# Patient Record
Sex: FEMALE | Race: WHITE | NOT HISPANIC OR LATINO | Employment: OTHER | URBAN - NONMETROPOLITAN AREA
[De-identification: names, ages, dates, MRNs, and addresses within clinical notes are randomized per-mention and may not be internally consistent; named-entity substitution may affect disease eponyms.]

---

## 2023-12-06 PROBLEM — M51.37 DEGENERATION OF LUMBAR OR LUMBOSACRAL INTERVERTEBRAL DISC: Status: ACTIVE | Noted: 2023-12-06

## 2023-12-06 PROBLEM — M51.369 DEGENERATION OF LUMBAR INTERVERTEBRAL DISC: Status: ACTIVE | Noted: 2023-12-06

## 2023-12-06 PROBLEM — M79.7 FIBROMYALGIA: Status: ACTIVE | Noted: 2023-12-06

## 2023-12-06 PROBLEM — N25.81 SECONDARY HYPERPARATHYROIDISM (MULTI): Status: ACTIVE | Noted: 2023-12-06

## 2023-12-06 PROBLEM — M79.10 MUSCLE PAIN: Status: ACTIVE | Noted: 2023-12-06

## 2023-12-06 PROBLEM — K21.9 ESOPHAGEAL REFLUX: Status: ACTIVE | Noted: 2023-12-06

## 2023-12-06 PROBLEM — M41.9 SCOLIOSIS OF THORACIC SPINE: Status: ACTIVE | Noted: 2023-12-06

## 2023-12-06 PROBLEM — G43.709 CHRONIC MIGRAINE WITHOUT AURA: Status: ACTIVE | Noted: 2023-12-06

## 2023-12-06 PROBLEM — S63.90XA HAND SPRAIN: Status: ACTIVE | Noted: 2023-12-06

## 2023-12-06 PROBLEM — M47.812 CERVICAL SPONDYLOSIS WITHOUT MYELOPATHY: Status: ACTIVE | Noted: 2023-12-06

## 2023-12-06 PROBLEM — S93.409A ANKLE SPRAIN: Status: ACTIVE | Noted: 2023-12-06

## 2023-12-06 PROBLEM — J45.909 ASTHMA (HHS-HCC): Status: ACTIVE | Noted: 2023-12-06

## 2023-12-06 PROBLEM — K21.9 GASTROESOPHAGEAL REFLUX DISEASE: Status: ACTIVE | Noted: 2023-12-06

## 2023-12-06 PROBLEM — B27.90 INFECTIOUS MONONUCLEOSIS: Status: ACTIVE | Noted: 2023-12-06

## 2023-12-06 PROBLEM — G43.109 MIGRAINE WITH AURA AND WITHOUT STATUS MIGRAINOSUS, NOT INTRACTABLE: Status: ACTIVE | Noted: 2023-12-06

## 2023-12-06 PROBLEM — M54.2 NECK PAIN ON LEFT SIDE: Status: ACTIVE | Noted: 2023-12-06

## 2023-12-06 PROBLEM — R91.8 LUNG NODULES: Status: ACTIVE | Noted: 2023-12-06

## 2023-12-06 PROBLEM — M51.36 DEGENERATION OF LUMBAR INTERVERTEBRAL DISC: Status: ACTIVE | Noted: 2023-12-06

## 2023-12-06 PROBLEM — E04.1 NONTOXIC UNINODULAR GOITER: Status: ACTIVE | Noted: 2023-12-06

## 2023-12-06 PROBLEM — M41.20 IDIOPATHIC SCOLIOSIS AND KYPHOSCOLIOSIS: Status: ACTIVE | Noted: 2023-12-06

## 2023-12-06 PROBLEM — M51.379 DEGENERATION OF LUMBAR OR LUMBOSACRAL INTERVERTEBRAL DISC: Status: ACTIVE | Noted: 2023-12-06

## 2024-05-09 ENCOUNTER — APPOINTMENT (OUTPATIENT)
Dept: PRIMARY CARE | Facility: CLINIC | Age: 49
End: 2024-05-09
Payer: COMMERCIAL

## 2024-05-22 ENCOUNTER — APPOINTMENT (OUTPATIENT)
Dept: RADIOLOGY | Facility: HOSPITAL | Age: 49
End: 2024-05-22
Payer: COMMERCIAL

## 2024-05-22 ENCOUNTER — HOSPITAL ENCOUNTER (EMERGENCY)
Facility: HOSPITAL | Age: 49
Discharge: HOME | End: 2024-05-22
Attending: STUDENT IN AN ORGANIZED HEALTH CARE EDUCATION/TRAINING PROGRAM
Payer: COMMERCIAL

## 2024-05-22 VITALS
HEIGHT: 64 IN | WEIGHT: 130 LBS | RESPIRATION RATE: 17 BRPM | OXYGEN SATURATION: 100 % | HEART RATE: 91 BPM | SYSTOLIC BLOOD PRESSURE: 131 MMHG | TEMPERATURE: 97.2 F | DIASTOLIC BLOOD PRESSURE: 69 MMHG | BODY MASS INDEX: 22.2 KG/M2

## 2024-05-22 DIAGNOSIS — N30.90 CYSTITIS: Primary | ICD-10-CM

## 2024-05-22 DIAGNOSIS — R10.9 ABDOMINAL PAIN, UNSPECIFIED ABDOMINAL LOCATION: ICD-10-CM

## 2024-05-22 DIAGNOSIS — B37.9 YEAST INFECTION: ICD-10-CM

## 2024-05-22 DIAGNOSIS — R11.2 NAUSEA AND VOMITING, UNSPECIFIED VOMITING TYPE: ICD-10-CM

## 2024-05-22 DIAGNOSIS — N20.0 KIDNEY STONES: ICD-10-CM

## 2024-05-22 LAB
ALBUMIN SERPL BCP-MCNC: 4.3 G/DL (ref 3.4–5)
ALP SERPL-CCNC: 72 U/L (ref 33–110)
ALT SERPL W P-5'-P-CCNC: 9 U/L (ref 7–45)
ANION GAP SERPL CALC-SCNC: 15 MMOL/L (ref 10–20)
APPEARANCE UR: ABNORMAL
AST SERPL W P-5'-P-CCNC: 21 U/L (ref 9–39)
BASOPHILS # BLD AUTO: 0.05 X10*3/UL (ref 0–0.1)
BASOPHILS NFR BLD AUTO: 0.7 %
BILIRUB SERPL-MCNC: 0.4 MG/DL (ref 0–1.2)
BILIRUB UR STRIP.AUTO-MCNC: NEGATIVE MG/DL
BUN SERPL-MCNC: 7 MG/DL (ref 6–23)
CALCIUM SERPL-MCNC: 9.9 MG/DL (ref 8.6–10.3)
CAOX CRY #/AREA UR COMP ASSIST: ABNORMAL /HPF
CHLORIDE SERPL-SCNC: 99 MMOL/L (ref 98–107)
CO2 SERPL-SCNC: 28 MMOL/L (ref 21–32)
COLOR UR: YELLOW
CREAT SERPL-MCNC: 1 MG/DL (ref 0.5–1.05)
EGFRCR SERPLBLD CKD-EPI 2021: 69 ML/MIN/1.73M*2
EOSINOPHIL # BLD AUTO: 0.02 X10*3/UL (ref 0–0.7)
EOSINOPHIL NFR BLD AUTO: 0.3 %
ERYTHROCYTE [DISTWIDTH] IN BLOOD BY AUTOMATED COUNT: 13.4 % (ref 11.5–14.5)
FLUAV RNA RESP QL NAA+PROBE: NOT DETECTED
FLUBV RNA RESP QL NAA+PROBE: NOT DETECTED
GLUCOSE SERPL-MCNC: 104 MG/DL (ref 74–99)
GLUCOSE UR STRIP.AUTO-MCNC: NORMAL MG/DL
HCT VFR BLD AUTO: 46.1 % (ref 36–46)
HGB BLD-MCNC: 15.3 G/DL (ref 12–16)
HOLD SPECIMEN: NORMAL
HOLD SPECIMEN: NORMAL
IMM GRANULOCYTES # BLD AUTO: 0.02 X10*3/UL (ref 0–0.7)
IMM GRANULOCYTES NFR BLD AUTO: 0.3 % (ref 0–0.9)
KETONES UR STRIP.AUTO-MCNC: ABNORMAL MG/DL
LACTATE SERPL-SCNC: 2.3 MMOL/L (ref 0.4–2)
LEUKOCYTE ESTERASE UR QL STRIP.AUTO: ABNORMAL
LIPASE SERPL-CCNC: 33 U/L (ref 9–82)
LYMPHOCYTES # BLD AUTO: 0.91 X10*3/UL (ref 1.2–4.8)
LYMPHOCYTES NFR BLD AUTO: 13.4 %
MCH RBC QN AUTO: 32.3 PG (ref 26–34)
MCHC RBC AUTO-ENTMCNC: 33.2 G/DL (ref 32–36)
MCV RBC AUTO: 97 FL (ref 80–100)
MONOCYTES # BLD AUTO: 0.33 X10*3/UL (ref 0.1–1)
MONOCYTES NFR BLD AUTO: 4.9 %
MUCOUS THREADS #/AREA URNS AUTO: ABNORMAL /LPF
NEUTROPHILS # BLD AUTO: 5.46 X10*3/UL (ref 1.2–7.7)
NEUTROPHILS NFR BLD AUTO: 80.4 %
NITRITE UR QL STRIP.AUTO: NEGATIVE
NRBC BLD-RTO: 0 /100 WBCS (ref 0–0)
PH UR STRIP.AUTO: 8.5 [PH]
PLATELET # BLD AUTO: 296 X10*3/UL (ref 150–450)
POTASSIUM SERPL-SCNC: 4.2 MMOL/L (ref 3.5–5.3)
PROT SERPL-MCNC: 7.7 G/DL (ref 6.4–8.2)
PROT UR STRIP.AUTO-MCNC: ABNORMAL MG/DL
RBC # BLD AUTO: 4.74 X10*6/UL (ref 4–5.2)
RBC # UR STRIP.AUTO: ABNORMAL /UL
RBC #/AREA URNS AUTO: >20 /HPF
SARS-COV-2 RNA RESP QL NAA+PROBE: NOT DETECTED
SODIUM SERPL-SCNC: 138 MMOL/L (ref 136–145)
SP GR UR STRIP.AUTO: 1.03
SQUAMOUS #/AREA URNS AUTO: ABNORMAL /HPF
UROBILINOGEN UR STRIP.AUTO-MCNC: ABNORMAL MG/DL
WBC # BLD AUTO: 6.8 X10*3/UL (ref 4.4–11.3)
WBC #/AREA URNS AUTO: ABNORMAL /HPF
YEAST BUDDING #/AREA UR COMP ASSIST: PRESENT /HPF

## 2024-05-22 PROCEDURE — 96375 TX/PRO/DX INJ NEW DRUG ADDON: CPT

## 2024-05-22 PROCEDURE — 80053 COMPREHEN METABOLIC PANEL: CPT | Performed by: STUDENT IN AN ORGANIZED HEALTH CARE EDUCATION/TRAINING PROGRAM

## 2024-05-22 PROCEDURE — 87635 SARS-COV-2 COVID-19 AMP PRB: CPT | Performed by: STUDENT IN AN ORGANIZED HEALTH CARE EDUCATION/TRAINING PROGRAM

## 2024-05-22 PROCEDURE — 2500000006 HC RX 250 W HCPCS SELF ADMINISTERED DRUGS (ALT 637 FOR ALL PAYERS): Performed by: STUDENT IN AN ORGANIZED HEALTH CARE EDUCATION/TRAINING PROGRAM

## 2024-05-22 PROCEDURE — 96374 THER/PROPH/DIAG INJ IV PUSH: CPT | Mod: 59

## 2024-05-22 PROCEDURE — 74177 CT ABD & PELVIS W/CONTRAST: CPT

## 2024-05-22 PROCEDURE — 81001 URINALYSIS AUTO W/SCOPE: CPT | Performed by: STUDENT IN AN ORGANIZED HEALTH CARE EDUCATION/TRAINING PROGRAM

## 2024-05-22 PROCEDURE — 36415 COLL VENOUS BLD VENIPUNCTURE: CPT | Performed by: STUDENT IN AN ORGANIZED HEALTH CARE EDUCATION/TRAINING PROGRAM

## 2024-05-22 PROCEDURE — 74177 CT ABD & PELVIS W/CONTRAST: CPT | Mod: FOREIGN READ | Performed by: RADIOLOGY

## 2024-05-22 PROCEDURE — 2500000001 HC RX 250 WO HCPCS SELF ADMINISTERED DRUGS (ALT 637 FOR MEDICARE OP): Performed by: STUDENT IN AN ORGANIZED HEALTH CARE EDUCATION/TRAINING PROGRAM

## 2024-05-22 PROCEDURE — 83690 ASSAY OF LIPASE: CPT | Performed by: STUDENT IN AN ORGANIZED HEALTH CARE EDUCATION/TRAINING PROGRAM

## 2024-05-22 PROCEDURE — 83605 ASSAY OF LACTIC ACID: CPT | Performed by: STUDENT IN AN ORGANIZED HEALTH CARE EDUCATION/TRAINING PROGRAM

## 2024-05-22 PROCEDURE — 2550000001 HC RX 255 CONTRASTS: Performed by: STUDENT IN AN ORGANIZED HEALTH CARE EDUCATION/TRAINING PROGRAM

## 2024-05-22 PROCEDURE — 85025 COMPLETE CBC W/AUTO DIFF WBC: CPT | Performed by: STUDENT IN AN ORGANIZED HEALTH CARE EDUCATION/TRAINING PROGRAM

## 2024-05-22 PROCEDURE — 99284 EMERGENCY DEPT VISIT MOD MDM: CPT | Mod: 25

## 2024-05-22 PROCEDURE — 2500000004 HC RX 250 GENERAL PHARMACY W/ HCPCS (ALT 636 FOR OP/ED): Performed by: STUDENT IN AN ORGANIZED HEALTH CARE EDUCATION/TRAINING PROGRAM

## 2024-05-22 RX ORDER — DICYCLOMINE HYDROCHLORIDE 10 MG/1
20 CAPSULE ORAL ONCE
Status: COMPLETED | OUTPATIENT
Start: 2024-05-22 | End: 2024-05-22

## 2024-05-22 RX ORDER — METOCLOPRAMIDE HYDROCHLORIDE 5 MG/ML
5 INJECTION INTRAMUSCULAR; INTRAVENOUS ONCE
Status: COMPLETED | OUTPATIENT
Start: 2024-05-22 | End: 2024-05-22

## 2024-05-22 RX ORDER — KETOROLAC TROMETHAMINE 15 MG/ML
15 INJECTION, SOLUTION INTRAMUSCULAR; INTRAVENOUS ONCE
Status: COMPLETED | OUTPATIENT
Start: 2024-05-22 | End: 2024-05-22

## 2024-05-22 RX ORDER — DICYCLOMINE HYDROCHLORIDE 10 MG/ML
20 INJECTION INTRAMUSCULAR ONCE
Status: DISCONTINUED | OUTPATIENT
Start: 2024-05-22 | End: 2024-05-22

## 2024-05-22 RX ORDER — ONDANSETRON HYDROCHLORIDE 2 MG/ML
4 INJECTION, SOLUTION INTRAVENOUS ONCE
Status: COMPLETED | OUTPATIENT
Start: 2024-05-22 | End: 2024-05-22

## 2024-05-22 RX ORDER — DICYCLOMINE HYDROCHLORIDE 20 MG/1
20 TABLET ORAL 4 TIMES DAILY PRN
Qty: 20 TABLET | Refills: 0 | Status: SHIPPED | OUTPATIENT
Start: 2024-05-22 | End: 2024-06-01

## 2024-05-22 RX ORDER — SULFAMETHOXAZOLE AND TRIMETHOPRIM 800; 160 MG/1; MG/1
1 TABLET ORAL EVERY 12 HOURS
Qty: 10 TABLET | Refills: 0 | Status: SHIPPED | OUTPATIENT
Start: 2024-05-22 | End: 2024-05-27

## 2024-05-22 RX ORDER — SULFAMETHOXAZOLE AND TRIMETHOPRIM 800; 160 MG/1; MG/1
1 TABLET ORAL ONCE
Status: COMPLETED | OUTPATIENT
Start: 2024-05-22 | End: 2024-05-22

## 2024-05-22 RX ORDER — ONDANSETRON HYDROCHLORIDE 2 MG/ML
4 INJECTION, SOLUTION INTRAVENOUS ONCE
Status: DISCONTINUED | OUTPATIENT
Start: 2024-05-22 | End: 2024-05-22

## 2024-05-22 RX ORDER — ONDANSETRON 4 MG/1
4 TABLET, ORALLY DISINTEGRATING ORAL EVERY 8 HOURS PRN
Qty: 20 TABLET | Refills: 0 | Status: SHIPPED | OUTPATIENT
Start: 2024-05-22 | End: 2024-05-29

## 2024-05-22 RX ORDER — FLUCONAZOLE 100 MG/1
150 TABLET ORAL ONCE
Status: COMPLETED | OUTPATIENT
Start: 2024-05-22 | End: 2024-05-22

## 2024-05-22 RX ADMIN — DICYCLOMINE HYDROCHLORIDE 20 MG: 10 CAPSULE ORAL at 02:05

## 2024-05-22 RX ADMIN — FLUCONAZOLE 150 MG: 100 TABLET ORAL at 05:31

## 2024-05-22 RX ADMIN — ONDANSETRON 4 MG: 2 INJECTION INTRAMUSCULAR; INTRAVENOUS at 05:27

## 2024-05-22 RX ADMIN — KETOROLAC TROMETHAMINE 15 MG: 15 INJECTION, SOLUTION INTRAMUSCULAR; INTRAVENOUS at 05:27

## 2024-05-22 RX ADMIN — SODIUM CHLORIDE 1000 ML: 9 INJECTION, SOLUTION INTRAVENOUS at 01:57

## 2024-05-22 RX ADMIN — SULFAMETHOXAZOLE AND TRIMETHOPRIM 1 TABLET: 800; 160 TABLET ORAL at 05:31

## 2024-05-22 RX ADMIN — IOHEXOL 75 ML: 350 INJECTION, SOLUTION INTRAVENOUS at 02:41

## 2024-05-22 RX ADMIN — METOCLOPRAMIDE 5 MG: 5 INJECTION, SOLUTION INTRAMUSCULAR; INTRAVENOUS at 02:03

## 2024-05-22 ASSESSMENT — PAIN SCALES - GENERAL
PAINLEVEL_OUTOF10: 0 - NO PAIN
PAINLEVEL_OUTOF10: 4
PAINLEVEL_OUTOF10: 2

## 2024-05-22 ASSESSMENT — COLUMBIA-SUICIDE SEVERITY RATING SCALE - C-SSRS
2. HAVE YOU ACTUALLY HAD ANY THOUGHTS OF KILLING YOURSELF?: NO
6. HAVE YOU EVER DONE ANYTHING, STARTED TO DO ANYTHING, OR PREPARED TO DO ANYTHING TO END YOUR LIFE?: NO
1. IN THE PAST MONTH, HAVE YOU WISHED YOU WERE DEAD OR WISHED YOU COULD GO TO SLEEP AND NOT WAKE UP?: NO

## 2024-05-22 ASSESSMENT — PAIN - FUNCTIONAL ASSESSMENT: PAIN_FUNCTIONAL_ASSESSMENT: 0-10

## 2024-05-22 ASSESSMENT — PAIN DESCRIPTION - DESCRIPTORS: DESCRIPTORS: DISCOMFORT

## 2024-05-22 NOTE — ED PROVIDER NOTES
"History/Exam limitations: none  HPI was provided by patient    HPI:    Chief Complaint   Patient presents with    Nausea    Vomiting    Diarrhea     C/O N/V/D ABOUT 2230, \"NOTHING TO TAKE AT HOME\". LAST PO INTAKE AFTER 8PM. DENIES OTHER COMPLAINTS.        Alecia Welch is a 48 y.o. female presenting with chief complaint of nausea vomiting and diarrhea.  She states symptoms began around 9 PM tonight.  They deny recent travel, suspicious food intake, similar symptoms found amongst close contacts or recent antibiotic use.  Was given Zofran en route with no improvement.  Pain is generalized in abdomen along with a left flank pain.  Does have history of kidney stones but unsure if this is similar.  Symptoms worse with palpation.  Denies any alleviating factors.       ROS:  All other review of systems are negative except as noted above and HPI or ROS.   CONSTITUTIONAL: fever, chills  ENT: sore throat, congestion, rhinorrhea  CARDIOVASCULAR: chest pain, palpitations, swelling  RESPIRATORY: cough, wheeze, shortness of breath  GI: nausea, vomiting, diarrhea, abdominal pain,  black or tarry stools, constipation  GENITOURINARY: dysuria, hematuria, frequency  MUSCULOSKELETAL: deformity, neck pain  SKIN: rash, lesion  NEUROLOGIC: headache, numbness, focal weakness  NOTES: All systems reviewed, negative except as described above     Physical Exam:  GENERAL: Alert, oriented , cooperative,  in no acute distress.  HEAD: normocephalic, atraumatic  SKIN:  dry skin, no lesions.  EYES: PERRL, EOMs intact,  Conjunctiva pink with no erythema or exudates. No scleral icterus.   ENT: No external deformities. Nares patent, mucus membranes moist.  Pharynx clear, uvula midline.   NECK: Supple, without meningismus. Trachea at midline. No lymphadenopathy.  PULMONARY: Clear bilaterally. No crackles, rhonchi, wheezing.  No respiratory distress.  No work of breathing.  CARDIAC: Regular rate and regular rhythm.  Pulses 2+ in radials and dorsal " pedal pulses bilaterally.  No murmur, rub, gallop.  No edema.  ABDOMEN: Nondistended, generalized tenderness to palpation, active bowel sounds.  No palpable organomegaly.  No rebound or guarding.  L CVA tenderness.  No pulsatile masses.  Negative Salguero sign, negative McBurney point  MUSCULOSKELETAL: Full range of motion throughout, no deformity.   NEUROLOGICAL:  no focal neuro deficits.  Strength 5 out of 5 throughout bilateral upper and lower extremities neurovascular intact in bilateral upper and lower extremities  PSYCHIATRIC: Appropriate mood and affect. Calm.    Medical Decision Making:     The patient presented for evaluation of abdominal pain.  Differential included but not limited to UTI, bowel obstruction, appendicitis, constipation, viral illness, pancreatitis, cholecystitis.  Imaging studies if performed were independently reviewed and interpreted by myself and confirmed by radiologist.    Was given Toradol Bentyl initially Reglan but preferred Zofran to given now and found to have a urinary tract infection.  Tolerating p.o. well here.  Was given prescription for Bactrim Bentyl and Zofran to go home with.     External Records Reviewed: I reviewed recent and relevant outside records    On reevaluation abdomen is benign and nonsurgical.  Patient feels safe for discharge home.  Patient nontoxic-appearing on reexamination.  Vital signs are stable.  The patient and caregiver are in agreement with the plan and given instructions to follow up with their PCP.       I discussed the differential, results and plan with the patient and/or family/friend/caregiver if present.       I emphasized the importance of follow-up with the physician I referred them to in the timeframe recommended.  I explained reasons for the patient to return to the Emergency Department. Additional verbal discharge instructions were also given and discussed with the patient to supplement those generated by the EMR. We also discussed  medications that were prescribed (if any) including common side effects and interactions. The patient was advised to abstain from driving, operating heavy machinery or making significant decisions while taking medications such as opiates and muscle relaxers that may impair this. All questions were addressed.  They understand return precautions and discharge instructions. The patient and/or family/friend/caregiver expressed understanding.        Past Medical History:   Diagnosis Date    Drug abuse (Multi)     Personal history of other infectious and parasitic diseases     History of infectious mononucleosis      Social History     Socioeconomic History    Marital status: Single     Spouse name: None    Number of children: None    Years of education: None    Highest education level: None   Occupational History    None   Tobacco Use    Smoking status: Former     Types: Cigarettes    Smokeless tobacco: None   Substance and Sexual Activity    Alcohol use: Not Currently    Drug use: Not Currently     Types: Heroin, Oxycodone     Comment: REPORTS FORMER USER    Sexual activity: Defer   Other Topics Concern    None   Social History Narrative    None     Social Determinants of Health     Financial Resource Strain: Not on file   Food Insecurity: Not on file   Transportation Needs: Not on file   Physical Activity: Not on file   Stress: Not on file   Social Connections: Not on file   Intimate Partner Violence: Not on file   Housing Stability: Not on file     Current Outpatient Medications   Medication Instructions    albuterol (ProAir HFA) 90 mcg/actuation inhaler inhalation    albuterol 90 mcg/actuation inhaler inhalation, 2 times daily    budesonide-formoteroL (Symbicort) 160-4.5 mcg/actuation inhaler inhalation    dicyclomine (BENTYL) 20 mg, oral, 4 times daily PRN    esomeprazole (NexIUM) 40 mg DR capsule oral    hydrocortisone 2.5 % cream Apply to affected area(s) twice daily. TOPICAL    isoniazid (NYDRAZID) 300 mg, oral,  Daily RT    LORazepam (Ativan) 1 mg tablet 1 tablet, oral, 3 times daily PRN    melatonin 3 mg tablet oral    methadone (Dolophine) 10 mg/5 mL solution oral    methadone (Dolophine) 5 mg/5 mL solution 121 mg daily ORAL    montelukast (Singulair) 10 mg tablet     onabotulinumtoxinA (Botox) 200 unit injection INJECT 155 UNITS OF BOTOX FOR REFRACTORY MIGRAINE THERAPY    ondansetron (Zofran) 4 mg tablet oral    ondansetron ODT (Zofran-ODT) 4 mg disintegrating tablet     ondansetron ODT (ZOFRAN-ODT) 4 mg, oral, Every 8 hours PRN    polyethylene glycol (Glycolax, Miralax) 17 gram/dose powder oral    POLYETHYLENE GLYCOL 1000,BULK, MISC uses daily    sulfamethoxazole-trimethoprim (Bactrim DS) 800-160 mg tablet 1 tablet, oral, Every 12 hours     Allergies   Allergen Reactions    Penicillins Anaphylaxis           ED Triage Vitals   Temp Pulse Resp BP   -- -- -- --      SpO2 Temp src Heart Rate Source Patient Position   -- -- -- --      BP Location FiO2 (%)     -- --               Labs and Imaging were reviewed and discussed with patient          ED Course as of 05/22/24 0524   Wed May 22, 2024   0257 WBC, Urine(!): 6-10 [WL]   0257 RBC, Urine(!): >20 [WL]   0257 Budding Yeast, Urine(!): PRESENT [WL]   0257 Leukocyte Esterase, Urine(!): 250 Mamta/µL [WL]   0458 On reevaluation pain is better but nausea has returned.  States Zofran worked better for her so we will give her another dose. [WL]   0511 Ct shows Mild circumferential thickening of the urinary bladder.  Correlate for  possible cystitis.  No distinct acute intra-abdominal or pelvic process otherwise  identified.  Nonobstructive 6 mm calculus of the left lower renal pole.  No  ureteral calculus or evidence for obstructive uropathy bilaterally.   [WL]      ED Course User Index  [WL] Richie Kingsley DO         Diagnoses as of 05/22/24 0524   Cystitis   Nausea and vomiting, unspecified vomiting type   Abdominal pain, unspecified abdominal location   Yeast infection   Kidney  stones               Procedure  Procedures         Note: This note was dictated by speech recognition. Minor errors in transcription may be present.          Richie Kingsley,   05/22/24 0516       Richie Kingsley,   05/22/24 0524

## 2024-05-23 ENCOUNTER — OFFICE VISIT (OUTPATIENT)
Dept: PRIMARY CARE | Facility: CLINIC | Age: 49
End: 2024-05-23
Payer: COMMERCIAL

## 2024-05-23 VITALS
HEIGHT: 64 IN | BODY MASS INDEX: 20.35 KG/M2 | TEMPERATURE: 98.4 F | WEIGHT: 119.2 LBS | HEART RATE: 99 BPM | DIASTOLIC BLOOD PRESSURE: 76 MMHG | OXYGEN SATURATION: 97 % | SYSTOLIC BLOOD PRESSURE: 122 MMHG

## 2024-05-23 DIAGNOSIS — G43.009 MIGRAINE WITHOUT AURA AND WITHOUT STATUS MIGRAINOSUS, NOT INTRACTABLE: ICD-10-CM

## 2024-05-23 DIAGNOSIS — K21.9 GASTROESOPHAGEAL REFLUX DISEASE, UNSPECIFIED WHETHER ESOPHAGITIS PRESENT: ICD-10-CM

## 2024-05-23 DIAGNOSIS — F32.4 MAJOR DEPRESSIVE DISORDER IN PARTIAL REMISSION, UNSPECIFIED WHETHER RECURRENT (CMS-HCC): ICD-10-CM

## 2024-05-23 DIAGNOSIS — R92.8 ABNORMAL MAMMOGRAM OF LEFT BREAST: ICD-10-CM

## 2024-05-23 DIAGNOSIS — Z12.31 ENCOUNTER FOR SCREENING MAMMOGRAM FOR MALIGNANT NEOPLASM OF BREAST: ICD-10-CM

## 2024-05-23 DIAGNOSIS — J45.40 MODERATE PERSISTENT ASTHMA WITHOUT COMPLICATION (HHS-HCC): Primary | ICD-10-CM

## 2024-05-23 PROCEDURE — 1036F TOBACCO NON-USER: CPT | Performed by: NURSE PRACTITIONER

## 2024-05-23 PROCEDURE — 99204 OFFICE O/P NEW MOD 45 MIN: CPT | Performed by: NURSE PRACTITIONER

## 2024-05-23 RX ORDER — TOPIRAMATE 100 MG/1
100 TABLET, FILM COATED ORAL DAILY
COMMUNITY
Start: 2023-10-18 | End: 2024-05-23 | Stop reason: SDUPTHER

## 2024-05-23 RX ORDER — ESOMEPRAZOLE MAGNESIUM 40 MG/1
40 CAPSULE, DELAYED RELEASE ORAL 2 TIMES DAILY
Qty: 180 CAPSULE | Refills: 3 | Status: SHIPPED | OUTPATIENT
Start: 2024-05-23 | End: 2025-05-23

## 2024-05-23 RX ORDER — BUDESONIDE AND FORMOTEROL FUMARATE DIHYDRATE 160; 4.5 UG/1; UG/1
2 AEROSOL RESPIRATORY (INHALATION)
Qty: 10.2 G | Refills: 3 | Status: SHIPPED | OUTPATIENT
Start: 2024-05-23 | End: 2025-05-23

## 2024-05-23 RX ORDER — SERTRALINE HYDROCHLORIDE 100 MG/1
100 TABLET, FILM COATED ORAL
COMMUNITY
Start: 2024-05-07

## 2024-05-23 RX ORDER — SERTRALINE HYDROCHLORIDE 50 MG/1
TABLET, FILM COATED ORAL
COMMUNITY
Start: 2023-10-17 | End: 2024-05-23 | Stop reason: DRUGHIGH

## 2024-05-23 RX ORDER — METHADONE HYDROCHLORIDE 5 MG/5ML
SOLUTION ORAL
COMMUNITY

## 2024-05-23 RX ORDER — QUETIAPINE FUMARATE 50 MG/1
50 TABLET, FILM COATED ORAL NIGHTLY PRN
COMMUNITY
Start: 2024-01-26 | End: 2024-05-23 | Stop reason: ALTCHOICE

## 2024-05-23 RX ORDER — TOPIRAMATE 100 MG/1
100 TABLET, FILM COATED ORAL DAILY
Qty: 90 TABLET | Refills: 3 | Status: SHIPPED | OUTPATIENT
Start: 2024-05-23 | End: 2025-05-23

## 2024-05-23 ASSESSMENT — ENCOUNTER SYMPTOMS
ARTHRALGIAS: 0
BRUISES/BLEEDS EASILY: 0
ACTIVITY CHANGE: 0
AGITATION: 0
WEAKNESS: 0
EYE DISCHARGE: 0
ADENOPATHY: 0
SHORTNESS OF BREATH: 0
BLOOD IN STOOL: 0
WHEEZING: 0
HEADACHES: 0
SINUS PAIN: 0
DYSURIA: 0
LOSS OF SENSATION IN FEET: 0
PHOTOPHOBIA: 0
DEPRESSION: 0
BACK PAIN: 0
APPETITE CHANGE: 0
OCCASIONAL FEELINGS OF UNSTEADINESS: 0
TREMORS: 0
CONSTIPATION: 0
HEMATURIA: 0
NERVOUS/ANXIOUS: 0
COUGH: 0
DIZZINESS: 0
DIARRHEA: 0
PALPITATIONS: 0
SORE THROAT: 0
JOINT SWELLING: 0
ABDOMINAL PAIN: 0

## 2024-05-23 ASSESSMENT — PATIENT HEALTH QUESTIONNAIRE - PHQ9
8. MOVING OR SPEAKING SO SLOWLY THAT OTHER PEOPLE COULD HAVE NOTICED. OR THE OPPOSITE, BEING SO FIGETY OR RESTLESS THAT YOU HAVE BEEN MOVING AROUND A LOT MORE THAN USUAL: SEVERAL DAYS
1. LITTLE INTEREST OR PLEASURE IN DOING THINGS: MORE THAN HALF THE DAYS
4. FEELING TIRED OR HAVING LITTLE ENERGY: NEARLY EVERY DAY
SUM OF ALL RESPONSES TO PHQ9 QUESTIONS 1 AND 2: 4
9. THOUGHTS THAT YOU WOULD BE BETTER OFF DEAD, OR OF HURTING YOURSELF: NOT AT ALL
10. IF YOU CHECKED OFF ANY PROBLEMS, HOW DIFFICULT HAVE THESE PROBLEMS MADE IT FOR YOU TO DO YOUR WORK, TAKE CARE OF THINGS AT HOME, OR GET ALONG WITH OTHER PEOPLE: NOT DIFFICULT AT ALL
7. TROUBLE CONCENTRATING ON THINGS, SUCH AS READING THE NEWSPAPER OR WATCHING TELEVISION: NOT AT ALL
5. POOR APPETITE OR OVEREATING: NEARLY EVERY DAY
3. TROUBLE FALLING OR STAYING ASLEEP OR SLEEPING TOO MUCH: NEARLY EVERY DAY
6. FEELING BAD ABOUT YOURSELF - OR THAT YOU ARE A FAILURE OR HAVE LET YOURSELF OR YOUR FAMILY DOWN: MORE THAN HALF THE DAYS
SUM OF ALL RESPONSES TO PHQ QUESTIONS 1-9: 16
2. FEELING DOWN, DEPRESSED OR HOPELESS: MORE THAN HALF THE DAYS

## 2024-05-23 ASSESSMENT — PAIN SCALES - GENERAL: PAINLEVEL: 2

## 2024-05-23 ASSESSMENT — LIFESTYLE VARIABLES
HOW OFTEN DO YOU HAVE A DRINK CONTAINING ALCOHOL: NEVER
HOW MANY STANDARD DRINKS CONTAINING ALCOHOL DO YOU HAVE ON A TYPICAL DAY: PATIENT DOES NOT DRINK

## 2024-05-23 NOTE — PATIENT INSTRUCTIONS
Focus on healthy eating including lean proteins and vegetables.  Avoid high carbohydrate high sugar foods and drinks.  Try to increase physical activity as tolerated.  Goal is for 160 minutes/week of physical activity.  Check blood pressure 2-3 times a week.  Call for blood pressures greater than 140/90.  Bring list of blood pressures to next visit.  Follow up with Dr. Nj as scheduled  Call to make appointment with Gastroenterology.  Continue to follow with [facility] with Dr. Guerrero

## 2024-05-23 NOTE — PROGRESS NOTES
Subjective   Patient ID: Alecia Welch is a 48 y.o. female who presents for Establish Care (Establish care,ER follow up for vomiting and diarrhea. Dx with UTI,kidney stone.Needs referral for GI to have a colonoscopy.She can go a couple weeks without a bowel movement.).    Presents today to establish care.  She is due for mammogram.  She has a history of depression, migraine, opioid dependence in remission.  She has been sober since 2015.  Her drug of choice was cocaine and opioids.  She follows at [facility].  Her psychiatrist is Dr. Nj.  She saw him earlier this month.  She has a follow-up appointment scheduled in 2 months.  She was seen in the emergency room yesterday for nausea vomiting and abdominal pain.  She was diagnosed with kidney stone and UTI.  She was given Bactrim and Zofran.  In the past she has followed with Dr. Osborne who is no longer providing care in this area.  She would like a referral to a new gastroenterologist in this area.  Denies blood in her stools or blood in her urine.  She denies chest pain.  She denies headaches dizziness or shortness of breath.     Review of Systems   Constitutional:  Negative for activity change and appetite change.   HENT:  Negative for congestion, ear pain, hearing loss, sinus pain and sore throat.    Eyes:  Negative for photophobia, discharge and visual disturbance.   Respiratory:  Negative for cough, shortness of breath and wheezing.    Cardiovascular:  Negative for chest pain, palpitations and leg swelling.   Gastrointestinal:  Negative for abdominal pain, blood in stool, constipation and diarrhea.   Endocrine: Negative for cold intolerance, heat intolerance and polyuria.   Genitourinary:  Negative for dysuria and hematuria.   Musculoskeletal:  Negative for arthralgias, back pain and joint swelling.   Skin:  Negative for rash.   Allergic/Immunologic: Negative for environmental allergies and food allergies.   Neurological:  Negative for  "dizziness, tremors, syncope, weakness and headaches.   Hematological:  Negative for adenopathy. Does not bruise/bleed easily.   Psychiatric/Behavioral:  Negative for agitation and suicidal ideas. The patient is not nervous/anxious.        Objective   /76 (BP Location: Right arm, Patient Position: Sitting)   Pulse 99   Temp 36.9 °C (98.4 °F) (Temporal)   Ht 1.626 m (5' 4\")   Wt 54.1 kg (119 lb 3.2 oz)   SpO2 97%   BMI 20.46 kg/m²     Physical Exam  Vitals reviewed.   Constitutional:       General: She is not in acute distress.     Appearance: Normal appearance.   HENT:      Head: Normocephalic.      Right Ear: Tympanic membrane normal.      Left Ear: Tympanic membrane normal.      Nose: Nose normal.      Mouth/Throat:      Mouth: Mucous membranes are moist.   Eyes:      Conjunctiva/sclera: Conjunctivae normal.      Pupils: Pupils are equal, round, and reactive to light.   Cardiovascular:      Rate and Rhythm: Normal rate and regular rhythm.      Pulses: Normal pulses.      Heart sounds: Normal heart sounds.   Pulmonary:      Effort: Pulmonary effort is normal.      Breath sounds: Normal breath sounds.   Abdominal:      General: Bowel sounds are normal.      Palpations: Abdomen is soft.   Musculoskeletal:         General: Normal range of motion.   Skin:     General: Skin is warm and dry.      Capillary Refill: Capillary refill takes less than 2 seconds.   Neurological:      Mental Status: She is alert and oriented to person, place, and time.   Psychiatric:         Mood and Affect: Mood normal.         Speech: Speech normal.         Assessment/Plan   Problem List Items Addressed This Visit             ICD-10-CM    Gastroesophageal reflux disease K21.9    Relevant Medications    esomeprazole (NexIUM) 40 mg DR capsule    Other Relevant Orders    Referral to Gastroenterology    Follow Up In Primary Care - Established    Asthma ([facility]) - Primary J45.909    Relevant Medications    budesonide-formoteroL " (Symbicort) 160-4.5 mcg/actuation inhaler    Other Relevant Orders    Follow Up In Primary Care - Established     Other Visit Diagnoses         Codes    Encounter for screening mammogram for malignant neoplasm of breast     Z12.31    Relevant Orders    BI mammo bilateral screening tomosynthesis    Major depressive disorder in partial remission, unspecified whether recurrent (CMS-HCC)     F32.4    Relevant Orders    Follow Up In Primary Care - Established    Migraine without aura and without status migrainosus, not intractable     G43.009    Relevant Medications    topiramate (Topamax) 100 mg tablet           Focus on healthy eating including lean proteins and vegetables.  Avoid high carbohydrate high sugar foods and drinks.  Try to increase physical activity as tolerated.  Goal is for 160 minutes/week of physical activity.  Check blood pressure 2-3 times a week.  Call for blood pressures greater than 140/90.  Bring list of blood pressures to next visit.  Follow up with Dr. Nj as scheduled  Call to make appointment with Gastroenterology.  Continue to follow with [facility] with Dr. Guerrero

## 2024-06-07 ENCOUNTER — HOSPITAL ENCOUNTER (OUTPATIENT)
Dept: RADIOLOGY | Facility: HOSPITAL | Age: 49
Discharge: HOME | End: 2024-06-07
Payer: COMMERCIAL

## 2024-06-07 VITALS — BODY MASS INDEX: 22.2 KG/M2 | HEIGHT: 64 IN | WEIGHT: 130 LBS

## 2024-06-07 DIAGNOSIS — Z12.31 ENCOUNTER FOR SCREENING MAMMOGRAM FOR MALIGNANT NEOPLASM OF BREAST: ICD-10-CM

## 2024-06-07 PROCEDURE — 77067 SCR MAMMO BI INCL CAD: CPT | Performed by: RADIOLOGY

## 2024-06-07 PROCEDURE — 77063 BREAST TOMOSYNTHESIS BI: CPT | Performed by: RADIOLOGY

## 2024-06-07 PROCEDURE — 77063 BREAST TOMOSYNTHESIS BI: CPT

## 2024-08-06 DIAGNOSIS — J45.40 MODERATE PERSISTENT ASTHMA WITHOUT COMPLICATION (HHS-HCC): Primary | ICD-10-CM

## 2024-08-06 RX ORDER — ALBUTEROL SULFATE 90 UG/1
INHALANT RESPIRATORY (INHALATION)
Qty: 8.5 G | Refills: 0 | Status: SHIPPED | OUTPATIENT
Start: 2024-08-06

## 2024-11-27 ENCOUNTER — APPOINTMENT (OUTPATIENT)
Dept: PRIMARY CARE | Facility: CLINIC | Age: 49
End: 2024-11-27
Payer: COMMERCIAL

## 2024-12-17 ENCOUNTER — APPOINTMENT (OUTPATIENT)
Dept: PRIMARY CARE | Facility: CLINIC | Age: 49
End: 2024-12-17
Payer: COMMERCIAL

## 2024-12-17 VITALS
SYSTOLIC BLOOD PRESSURE: 100 MMHG | OXYGEN SATURATION: 98 % | HEART RATE: 115 BPM | BODY MASS INDEX: 19.87 KG/M2 | RESPIRATION RATE: 17 BRPM | HEIGHT: 64 IN | DIASTOLIC BLOOD PRESSURE: 64 MMHG | WEIGHT: 116.4 LBS | TEMPERATURE: 97.7 F

## 2024-12-17 DIAGNOSIS — Z00.00 ROUTINE GENERAL MEDICAL EXAMINATION AT HEALTH CARE FACILITY: Primary | ICD-10-CM

## 2024-12-17 DIAGNOSIS — Z12.12 SCREENING FOR COLORECTAL CANCER: ICD-10-CM

## 2024-12-17 DIAGNOSIS — R73.9 HYPERGLYCEMIA: ICD-10-CM

## 2024-12-17 DIAGNOSIS — F32.4 MAJOR DEPRESSIVE DISORDER IN PARTIAL REMISSION, UNSPECIFIED WHETHER RECURRENT (CMS-HCC): ICD-10-CM

## 2024-12-17 DIAGNOSIS — G43.009 MIGRAINE WITHOUT AURA AND WITHOUT STATUS MIGRAINOSUS, NOT INTRACTABLE: ICD-10-CM

## 2024-12-17 DIAGNOSIS — R11.0 NAUSEA: ICD-10-CM

## 2024-12-17 DIAGNOSIS — E78.00 HYPERCHOLESTEROLEMIA: ICD-10-CM

## 2024-12-17 DIAGNOSIS — Z12.11 SCREENING FOR COLORECTAL CANCER: ICD-10-CM

## 2024-12-17 DIAGNOSIS — E55.9 VITAMIN D DEFICIENCY: ICD-10-CM

## 2024-12-17 DIAGNOSIS — K21.9 GASTROESOPHAGEAL REFLUX DISEASE, UNSPECIFIED WHETHER ESOPHAGITIS PRESENT: ICD-10-CM

## 2024-12-17 DIAGNOSIS — J45.40 MODERATE PERSISTENT ASTHMA WITHOUT COMPLICATION (HHS-HCC): ICD-10-CM

## 2024-12-17 DIAGNOSIS — R53.83 FATIGUE, UNSPECIFIED TYPE: ICD-10-CM

## 2024-12-17 DIAGNOSIS — K59.03 DRUG-INDUCED CONSTIPATION: ICD-10-CM

## 2024-12-17 PROCEDURE — G0439 PPPS, SUBSEQ VISIT: HCPCS | Performed by: NURSE PRACTITIONER

## 2024-12-17 PROCEDURE — 84443 ASSAY THYROID STIM HORMONE: CPT

## 2024-12-17 PROCEDURE — 83036 HEMOGLOBIN GLYCOSYLATED A1C: CPT

## 2024-12-17 PROCEDURE — 85025 COMPLETE CBC W/AUTO DIFF WBC: CPT

## 2024-12-17 PROCEDURE — 80061 LIPID PANEL: CPT

## 2024-12-17 PROCEDURE — 82306 VITAMIN D 25 HYDROXY: CPT

## 2024-12-17 PROCEDURE — 3008F BODY MASS INDEX DOCD: CPT | Performed by: NURSE PRACTITIONER

## 2024-12-17 PROCEDURE — 80053 COMPREHEN METABOLIC PANEL: CPT

## 2024-12-17 PROCEDURE — 1036F TOBACCO NON-USER: CPT | Performed by: NURSE PRACTITIONER

## 2024-12-17 RX ORDER — ONDANSETRON 4 MG/1
4 TABLET, FILM COATED ORAL EVERY 8 HOURS PRN
Qty: 20 TABLET | Refills: 2 | Status: SHIPPED | OUTPATIENT
Start: 2024-12-17

## 2024-12-17 RX ORDER — ONDANSETRON 4 MG/1
4 TABLET, FILM COATED ORAL EVERY 8 HOURS PRN
COMMUNITY
End: 2024-12-17 | Stop reason: SDUPTHER

## 2024-12-17 RX ORDER — TOPIRAMATE 100 MG/1
100 TABLET, FILM COATED ORAL DAILY
Qty: 90 TABLET | Refills: 3 | Status: SHIPPED | OUTPATIENT
Start: 2024-12-17 | End: 2025-12-17

## 2024-12-17 RX ORDER — SUMATRIPTAN SUCCINATE 100 MG/1
100 TABLET ORAL ONCE AS NEEDED
Qty: 8 TABLET | Refills: 2 | Status: SHIPPED | OUTPATIENT
Start: 2024-12-17

## 2024-12-17 RX ORDER — SUMATRIPTAN SUCCINATE 100 MG/1
100 TABLET ORAL ONCE AS NEEDED
COMMUNITY
End: 2024-12-17 | Stop reason: SDUPTHER

## 2024-12-17 RX ORDER — ALBUTEROL SULFATE 90 UG/1
2 INHALANT RESPIRATORY (INHALATION) EVERY 4 HOURS PRN
Qty: 8.5 G | Refills: 3 | Status: SHIPPED | OUTPATIENT
Start: 2024-12-17

## 2024-12-17 RX ORDER — ESOMEPRAZOLE MAGNESIUM 40 MG/1
40 CAPSULE, DELAYED RELEASE ORAL 2 TIMES DAILY
Qty: 180 CAPSULE | Refills: 3 | Status: SHIPPED | OUTPATIENT
Start: 2024-12-17 | End: 2025-12-17

## 2024-12-17 RX ORDER — BUDESONIDE AND FORMOTEROL FUMARATE DIHYDRATE 160; 4.5 UG/1; UG/1
2 AEROSOL RESPIRATORY (INHALATION)
Qty: 10.2 G | Refills: 3 | Status: SHIPPED | OUTPATIENT
Start: 2024-12-17 | End: 2025-12-17

## 2024-12-17 RX ORDER — POLYETHYLENE GLYCOL 3350 17 G/17G
17 POWDER, FOR SOLUTION ORAL DAILY PRN
Qty: 510 G | Refills: 3 | Status: SHIPPED | OUTPATIENT
Start: 2024-12-17

## 2024-12-17 ASSESSMENT — ENCOUNTER SYMPTOMS
SHORTNESS OF BREATH: 0
SINUS PAIN: 0
APPETITE CHANGE: 0
AGITATION: 0
BRUISES/BLEEDS EASILY: 0
SORE THROAT: 0
COUGH: 0
PHOTOPHOBIA: 0
CONSTIPATION: 0
NERVOUS/ANXIOUS: 0
ADENOPATHY: 0
JOINT SWELLING: 0
PALPITATIONS: 0
WHEEZING: 0
ABDOMINAL PAIN: 0
DIZZINESS: 0
EYE DISCHARGE: 0
ARTHRALGIAS: 0
BLOOD IN STOOL: 0
DYSURIA: 0
ACTIVITY CHANGE: 0
WEAKNESS: 0
HEADACHES: 0
BACK PAIN: 0
DIARRHEA: 0
HEMATURIA: 0
TREMORS: 0

## 2024-12-17 ASSESSMENT — ACTIVITIES OF DAILY LIVING (ADL)
MANAGING_FINANCES: INDEPENDENT
DOING_HOUSEWORK: INDEPENDENT
GROCERY_SHOPPING: INDEPENDENT
BATHING: INDEPENDENT
TAKING_MEDICATION: INDEPENDENT
DRESSING: INDEPENDENT

## 2024-12-17 ASSESSMENT — LIFESTYLE VARIABLES: HOW OFTEN DO YOU HAVE A DRINK CONTAINING ALCOHOL: NEVER

## 2024-12-17 ASSESSMENT — PATIENT HEALTH QUESTIONNAIRE - PHQ9
SUM OF ALL RESPONSES TO PHQ9 QUESTIONS 1 AND 2: 0
2. FEELING DOWN, DEPRESSED OR HOPELESS: NOT AT ALL
1. LITTLE INTEREST OR PLEASURE IN DOING THINGS: NOT AT ALL

## 2024-12-17 ASSESSMENT — PAIN SCALES - GENERAL: PAINLEVEL_OUTOF10: 5

## 2024-12-17 NOTE — PATIENT INSTRUCTIONS
Focus on healthy eating including lean proteins and vegetables.  Avoid high carbohydrate high sugar foods and drinks.  Try to increase physical activity as tolerated.  Goal is for 160 minutes/week of physical activity.  Check blood pressure 2-3 times a week.  Call for blood pressures greater than 140/90.  Bring list of blood pressures to next visit.  Labwork obtained today.  Will address results when available  Refills sent

## 2024-12-17 NOTE — ASSESSMENT & PLAN NOTE
Orders:    Follow Up In Primary Care - Established    Referral to Gastroenterology; Future    CBC and Auto Differential; Future    Comprehensive Metabolic Panel; Future    esomeprazole (NexIUM) 40 mg DR capsule; Take 1 capsule (40 mg) by mouth 2 times a day. Do not open capsule.    Follow Up In Primary Care - Established; Future

## 2024-12-17 NOTE — ASSESSMENT & PLAN NOTE
Orders:    Follow Up In Primary Care - Established    CBC and Auto Differential; Future    Comprehensive Metabolic Panel; Future    albuterol 90 mcg/actuation inhaler; Inhale 2 puffs every 4 hours if needed for wheezing.    budesonide-formoteroL (Symbicort) 160-4.5 mcg/actuation inhaler; Inhale 2 puffs 2 times a day.    Follow Up In Primary Care - Established; Future    Follow Up In Primary Care - Established; Future

## 2024-12-17 NOTE — PROGRESS NOTES
Subjective   Reason for Visit: Alecia Welch is an 49 y.o. female here for a Medicare Wellness visit.     Past Medical, Surgical, and Family History reviewed and updated in chart.    Reviewed all medications by prescribing practitioner or clinical pharmacist (such as prescriptions, OTCs, herbal therapies and supplements) and documented in the medical record.    Presents today for annual Medicare wellness exam.  She denies new concerns today.  She does need some refills today.  [place] [address]  [phone] ext [phone]  Where she gets her methadone.  She continues to be sober since 2015.  She continues to follow with Dr. Nj for psychiatry.  Denies any new concerns today.  She is due for lab work today.  She is also in need of refills which we will send today.  She does express some concern regarding family history of diabetes.  Will check hemoglobin A1c to evaluate.        Patient Care Team:  CELENA Mcdonough as PCP - General (Family Medicine)  CELENA Mcdonough as PCP - [facility] PCP     Review of Systems   Constitutional:  Negative for activity change and appetite change.   HENT:  Negative for congestion, ear pain, hearing loss, sinus pain and sore throat.    Eyes:  Negative for photophobia, discharge and visual disturbance.   Respiratory:  Negative for cough, shortness of breath and wheezing.    Cardiovascular:  Negative for chest pain, palpitations and leg swelling.   Gastrointestinal:  Negative for abdominal pain, blood in stool, constipation and diarrhea.   Endocrine: Negative for cold intolerance, heat intolerance and polyuria.   Genitourinary:  Negative for dysuria and hematuria.   Musculoskeletal:  Negative for arthralgias, back pain and joint swelling.   Skin:  Negative for rash.   Allergic/Immunologic: Negative for environmental allergies and food allergies.   Neurological:  Negative for dizziness,  "tremors, syncope, weakness and headaches.   Hematological:  Negative for adenopathy. Does not bruise/bleed easily.   Psychiatric/Behavioral:  Negative for agitation and suicidal ideas. The patient is not nervous/anxious.        Objective   Vitals:  /64   Pulse (!) 115   Temp 36.5 °C (97.7 °F)   Resp 17   Ht 1.626 m (5' 4\")   Wt 52.8 kg (116 lb 6.4 oz)   SpO2 98%   BMI 19.98 kg/m²       Physical Exam  Vitals reviewed.   Constitutional:       General: She is not in acute distress.     Appearance: Normal appearance.   HENT:      Head: Normocephalic.      Right Ear: Tympanic membrane normal.      Left Ear: Tympanic membrane normal.      Nose: Nose normal.      Mouth/Throat:      Mouth: Mucous membranes are moist.   Eyes:      Conjunctiva/sclera: Conjunctivae normal.      Pupils: Pupils are equal, round, and reactive to light.   Cardiovascular:      Rate and Rhythm: Normal rate and regular rhythm.      Pulses: Normal pulses.      Heart sounds: Normal heart sounds.   Pulmonary:      Effort: Pulmonary effort is normal.      Breath sounds: Normal breath sounds.   Abdominal:      General: Bowel sounds are normal.      Palpations: Abdomen is soft.   Musculoskeletal:         General: Normal range of motion.   Skin:     General: Skin is warm and dry.      Capillary Refill: Capillary refill takes less than 2 seconds.   Neurological:      Mental Status: She is alert and oriented to person, place, and time.   Psychiatric:         Mood and Affect: Mood normal.         Speech: Speech normal.         Assessment & Plan  Moderate persistent asthma without complication ([facility])    Orders:    Follow Up In Primary Care - Established    CBC and Auto Differential; Future    Comprehensive Metabolic Panel; Future    albuterol 90 mcg/actuation inhaler; Inhale 2 puffs every 4 hours if needed for wheezing.    budesonide-formoteroL (Symbicort) 160-4.5 mcg/actuation inhaler; Inhale 2 puffs 2 times a day.    Follow Up In Primary Care " - Established; Future    Follow Up In Primary Care - Established; Future    Major depressive disorder in partial remission, unspecified whether recurrent (CMS-HCC)    Orders:    Follow Up In Primary Care - Established    Comprehensive Metabolic Panel; Future    Follow Up In Primary Care - Established; Future    Gastroesophageal reflux disease, unspecified whether esophagitis present    Orders:    Follow Up In Primary Care - Established    Referral to Gastroenterology; Future    CBC and Auto Differential; Future    Comprehensive Metabolic Panel; Future    esomeprazole (NexIUM) 40 mg DR capsule; Take 1 capsule (40 mg) by mouth 2 times a day. Do not open capsule.    Follow Up In Primary Care - Established; Future    Screening for colorectal cancer         Hyperglycemia    Orders:    Hemoglobin A1C; Future    Hypercholesterolemia    Orders:    Lipid Panel    Fatigue, unspecified type    Orders:    TSH with reflex to Free T4 if abnormal; Future    Vitamin D deficiency    Orders:    Vitamin D 25-Hydroxy,Total (for eval of Vitamin D levels); Future    Migraine without aura and without status migrainosus, not intractable    Orders:    SUMAtriptan (Imitrex) 100 mg tablet; Take 1 tablet (100 mg) by mouth 1 time if needed for migraine. May repeat in 2 hours if HA not improved Do Not take more than 2 tablets in 24 hours    ondansetron (Zofran) 4 mg tablet; Take 1 tablet (4 mg) by mouth every 8 hours if needed for nausea or vomiting.    topiramate (Topamax) 100 mg tablet; Take 1 tablet (100 mg) by mouth once daily.    Follow Up In Primary Care - Established; Future    Drug-induced constipation    Orders:    polyethylene glycol (Glycolax, Miralax) 17 gram/dose powder; Mix 17 g of powder and drink once daily as needed for constipation.    Nausea    Orders:    ondansetron (Zofran) 4 mg tablet; Take 1 tablet (4 mg) by mouth every 8 hours if needed for nausea or vomiting.    Routine general medical examination at [facility]  facility       Focus on healthy eating including lean proteins and vegetables.  Avoid high carbohydrate high sugar foods and drinks.  Try to increase physical activity as tolerated.  Goal is for 160 minutes/week of physical activity.  Check blood pressure 2-3 times a week.  Call for blood pressures greater than 140/90.  Bring list of blood pressures to next visit.  Labwork obtained today.  Will address results when available  Refills sent

## 2024-12-18 LAB
25(OH)D3 SERPL-MCNC: 6 NG/ML (ref 30–100)
ALBUMIN SERPL BCP-MCNC: 4.4 G/DL (ref 3.4–5)
ALP SERPL-CCNC: 68 U/L (ref 33–110)
ALT SERPL W P-5'-P-CCNC: 5 U/L (ref 7–45)
ANION GAP SERPL CALC-SCNC: 10 MMOL/L (ref 10–20)
AST SERPL W P-5'-P-CCNC: 13 U/L (ref 9–39)
BASOPHILS # BLD AUTO: 0.03 X10*3/UL (ref 0–0.1)
BASOPHILS NFR BLD AUTO: 0.6 %
BILIRUB SERPL-MCNC: 0.4 MG/DL (ref 0–1.2)
BUN SERPL-MCNC: 8 MG/DL (ref 6–23)
CALCIUM SERPL-MCNC: 9.9 MG/DL (ref 8.6–10.6)
CHLORIDE SERPL-SCNC: 104 MMOL/L (ref 98–107)
CHOLEST SERPL-MCNC: 269 MG/DL (ref 0–199)
CHOLESTEROL/HDL RATIO: 4.1
CO2 SERPL-SCNC: 27 MMOL/L (ref 21–32)
CREAT SERPL-MCNC: 1.06 MG/DL (ref 0.5–1.05)
EGFRCR SERPLBLD CKD-EPI 2021: 64 ML/MIN/1.73M*2
EOSINOPHIL # BLD AUTO: 0.04 X10*3/UL (ref 0–0.7)
EOSINOPHIL NFR BLD AUTO: 0.8 %
ERYTHROCYTE [DISTWIDTH] IN BLOOD BY AUTOMATED COUNT: 13.6 % (ref 11.5–14.5)
EST. AVERAGE GLUCOSE BLD GHB EST-MCNC: 88 MG/DL
GLUCOSE SERPL-MCNC: 76 MG/DL (ref 74–99)
HBA1C MFR BLD: 4.7 %
HCT VFR BLD AUTO: 39.5 % (ref 36–46)
HDLC SERPL-MCNC: 66.3 MG/DL
HGB BLD-MCNC: 13.5 G/DL (ref 12–16)
IMM GRANULOCYTES # BLD AUTO: 0.01 X10*3/UL (ref 0–0.7)
IMM GRANULOCYTES NFR BLD AUTO: 0.2 % (ref 0–0.9)
LDLC SERPL CALC-MCNC: 178 MG/DL
LYMPHOCYTES # BLD AUTO: 1.81 X10*3/UL (ref 1.2–4.8)
LYMPHOCYTES NFR BLD AUTO: 35.2 %
MCH RBC QN AUTO: 35.6 PG (ref 26–34)
MCHC RBC AUTO-ENTMCNC: 34.2 G/DL (ref 32–36)
MCV RBC AUTO: 104 FL (ref 80–100)
MONOCYTES # BLD AUTO: 0.35 X10*3/UL (ref 0.1–1)
MONOCYTES NFR BLD AUTO: 6.8 %
NEUTROPHILS # BLD AUTO: 2.9 X10*3/UL (ref 1.2–7.7)
NEUTROPHILS NFR BLD AUTO: 56.4 %
NON HDL CHOLESTEROL: 203 MG/DL (ref 0–149)
NRBC BLD-RTO: 0 /100 WBCS (ref 0–0)
PLATELET # BLD AUTO: 291 X10*3/UL (ref 150–450)
POTASSIUM SERPL-SCNC: 4.1 MMOL/L (ref 3.5–5.3)
PROT SERPL-MCNC: 7.4 G/DL (ref 6.4–8.2)
RBC # BLD AUTO: 3.79 X10*6/UL (ref 4–5.2)
SODIUM SERPL-SCNC: 137 MMOL/L (ref 136–145)
TRIGL SERPL-MCNC: 123 MG/DL (ref 0–149)
TSH SERPL-ACNC: 1.83 MIU/L (ref 0.44–3.98)
VLDL: 25 MG/DL (ref 0–40)
WBC # BLD AUTO: 5.1 X10*3/UL (ref 4.4–11.3)

## 2024-12-18 RX ORDER — ERGOCALCIFEROL 1.25 MG/1
50000 CAPSULE ORAL
Qty: 12 CAPSULE | Refills: 0 | Status: SHIPPED | OUTPATIENT
Start: 2024-12-22 | End: 2025-03-16

## 2025-03-03 DIAGNOSIS — J45.40 MODERATE PERSISTENT ASTHMA WITHOUT COMPLICATION (HHS-HCC): ICD-10-CM

## 2025-03-04 RX ORDER — ALBUTEROL SULFATE 90 UG/1
2 INHALANT RESPIRATORY (INHALATION) EVERY 4 HOURS PRN
Qty: 8.5 G | Refills: 3 | Status: SHIPPED | OUTPATIENT
Start: 2025-03-04

## 2025-03-07 ENCOUNTER — APPOINTMENT (OUTPATIENT)
Dept: RADIOLOGY | Facility: HOSPITAL | Age: 50
End: 2025-03-07
Payer: COMMERCIAL

## 2025-03-07 ENCOUNTER — HOSPITAL ENCOUNTER (EMERGENCY)
Facility: HOSPITAL | Age: 50
Discharge: HOME | End: 2025-03-07
Attending: STUDENT IN AN ORGANIZED HEALTH CARE EDUCATION/TRAINING PROGRAM
Payer: COMMERCIAL

## 2025-03-07 VITALS
HEART RATE: 89 BPM | HEIGHT: 64 IN | OXYGEN SATURATION: 99 % | BODY MASS INDEX: 19.63 KG/M2 | TEMPERATURE: 97.7 F | DIASTOLIC BLOOD PRESSURE: 78 MMHG | RESPIRATION RATE: 18 BRPM | WEIGHT: 115 LBS | SYSTOLIC BLOOD PRESSURE: 120 MMHG

## 2025-03-07 DIAGNOSIS — S46.002A INJURY OF LEFT ROTATOR CUFF, INITIAL ENCOUNTER: Primary | ICD-10-CM

## 2025-03-07 PROCEDURE — 2500000004 HC RX 250 GENERAL PHARMACY W/ HCPCS (ALT 636 FOR OP/ED): Performed by: STUDENT IN AN ORGANIZED HEALTH CARE EDUCATION/TRAINING PROGRAM

## 2025-03-07 PROCEDURE — 2500000001 HC RX 250 WO HCPCS SELF ADMINISTERED DRUGS (ALT 637 FOR MEDICARE OP): Performed by: STUDENT IN AN ORGANIZED HEALTH CARE EDUCATION/TRAINING PROGRAM

## 2025-03-07 PROCEDURE — 2500000005 HC RX 250 GENERAL PHARMACY W/O HCPCS: Performed by: STUDENT IN AN ORGANIZED HEALTH CARE EDUCATION/TRAINING PROGRAM

## 2025-03-07 PROCEDURE — 96372 THER/PROPH/DIAG INJ SC/IM: CPT | Performed by: STUDENT IN AN ORGANIZED HEALTH CARE EDUCATION/TRAINING PROGRAM

## 2025-03-07 PROCEDURE — 99284 EMERGENCY DEPT VISIT MOD MDM: CPT | Performed by: STUDENT IN AN ORGANIZED HEALTH CARE EDUCATION/TRAINING PROGRAM

## 2025-03-07 PROCEDURE — 73030 X-RAY EXAM OF SHOULDER: CPT | Mod: LT

## 2025-03-07 PROCEDURE — 73030 X-RAY EXAM OF SHOULDER: CPT | Mod: LEFT SIDE | Performed by: RADIOLOGY

## 2025-03-07 RX ORDER — ACETAMINOPHEN 325 MG/1
975 TABLET ORAL ONCE
Status: COMPLETED | OUTPATIENT
Start: 2025-03-07 | End: 2025-03-07

## 2025-03-07 RX ORDER — KETOROLAC TROMETHAMINE 15 MG/ML
15 INJECTION, SOLUTION INTRAMUSCULAR; INTRAVENOUS ONCE
Status: COMPLETED | OUTPATIENT
Start: 2025-03-07 | End: 2025-03-07

## 2025-03-07 RX ORDER — LIDOCAINE 560 MG/1
1 PATCH PERCUTANEOUS; TOPICAL; TRANSDERMAL DAILY
Qty: 20 PATCH | Refills: 0 | Status: SHIPPED | OUTPATIENT
Start: 2025-03-07

## 2025-03-07 RX ORDER — LIDOCAINE 560 MG/1
1 PATCH PERCUTANEOUS; TOPICAL; TRANSDERMAL ONCE
Status: DISCONTINUED | OUTPATIENT
Start: 2025-03-07 | End: 2025-03-07 | Stop reason: HOSPADM

## 2025-03-07 RX ORDER — IBUPROFEN 200 MG
400 TABLET ORAL EVERY 6 HOURS PRN
Qty: 100 TABLET | Refills: 0 | Status: SHIPPED | OUTPATIENT
Start: 2025-03-07

## 2025-03-07 RX ORDER — ACETAMINOPHEN 500 MG
1000 TABLET ORAL EVERY 6 HOURS PRN
Qty: 100 TABLET | Refills: 0 | Status: SHIPPED | OUTPATIENT
Start: 2025-03-07

## 2025-03-07 RX ADMIN — LIDOCAINE 1 PATCH: 4 PATCH TOPICAL at 03:05

## 2025-03-07 RX ADMIN — ACETAMINOPHEN 975 MG: 325 TABLET ORAL at 03:05

## 2025-03-07 RX ADMIN — KETOROLAC TROMETHAMINE 15 MG: 15 INJECTION, SOLUTION INTRAMUSCULAR; INTRAVENOUS at 03:05

## 2025-03-07 ASSESSMENT — PAIN - FUNCTIONAL ASSESSMENT: PAIN_FUNCTIONAL_ASSESSMENT: 0-10

## 2025-03-07 ASSESSMENT — PAIN SCALES - GENERAL: PAINLEVEL_OUTOF10: 0 - NO PAIN

## 2025-03-07 NOTE — ED PROVIDER NOTES
Emergency Department Provider Note        History of Present Illness     History provided by: Patient  Limitations to History: None  External Records Reviewed with Brief Summary:  Outpatient visit from 24 documenting history of asthma, depression, GERD, hyperlipidemia, chronic migraines    HPI:  Alecia Welch is a 49 y.o. female with history of fibromyalgia, additional above past medical history presenting to the emergency department with left arm pain since December.  She reports she had a near fall in December where she caught herself with her outstretched left arm, felt a pull, sharp pain on the medial aspect of the upper left arm along the medial aspect of the bicep muscle.  Reports that initially she felt okay, but had progressive pain range of motion limitation of the left shoulder since then.  No weakness or numbness in the hand.  Has not seen anyone for this.    Physical Exam   Triage vitals:  T 36.5 °C (97.7 °F)  HR (!) 110  BP (!) 126/93  RR 16  O2 100 %      General: Awake, alert, in no acute distress  CV: Tachycardic rate, regular rhythm. Radial pulses 2+ bilaterally  Resp: Breathing non-labored, speaking in full sentences.  Clear to auscultation bilaterally  MSK/Extremities: No gross bony deformities. Moving all extremities, but with range of motion limitation in the left shoulder, unable to lift arm above the shoulder.  Has positive empty can test with significant pain.  Full range of motion of the elbow, wrist.  Normal strength in bicep, tricep, radial, ulnar, and median nerve distributions.  Normal sensation in radial, ulnar, median nerves distributions.  No bony tenderness to the left shoulder, left upper extremity  Skin: Warm. Appropriate color  Neuro: Alert. Oriented. Face symmetric. Speech is fluent.  Gross strength and sensation intact in b/l UE and LEs  Psych: Appropriate mood and affect    Medical Decision Making & ED Course   Medical Decision Makin y.o. female presenting to  the emergency department with left shoulder pain.  On arrival, vital signs notable for mild tachycardia, otherwise within normal limits.  Patient has been dealing with this pain chronically December, suspect musculoskeletal injury.  No infectious symptoms, no cardiac symptoms to explain her tachycardia, suspect secondary to pain.  Will treat with Tylenol, Toradol, lidocaine patch.  Will obtain x-ray as I am going to refer her to orthopedic surgery for a likely rotator cuff injury given her clinical examination.  She would benefit from physical therapy for her range of motion limitations.  Discussed this with patient.  She has no evidence of neurovascular compromise at this time.  Will discharge with instructions to take Tylenol, ibuprofen, lidocaine patches as needed for pain.  Will provide printout of range of motion exercises.  ----      Differential diagnoses considered include but are not limited to: Fracture, dislocation, rotator cuff injury, infectious process     Social Determinants of Health which Significantly Impact Care: None identified     EKG Independent Interpretation: EKG not obtained    Independent Result Review and Interpretation: Relevant laboratory and radiographic results were reviewed and independently interpreted by myself.  As necessary, they are commented on in the ED Course.    Chronic conditions affecting the patient's care: None    The patient was discussed with the following consultants/services: None    Care Considerations: None    ED Course:  Diagnoses as of 03/07/25 0239   Injury of left rotator cuff, initial encounter     Disposition   As a result of the work-up, the patient was discharged home.  she was informed of her diagnosis and instructed to come back with any concerns or worsening of condition.  she and was agreeable to the plan as discussed above.  she was given the opportunity to ask questions.  All of the patient's questions were answered.    Procedures    Procedures        Tadeo Persaud MD  Emergency Medicine     Tadeo Persaud MD  03/07/25 0453

## 2025-03-07 NOTE — DISCHARGE INSTRUCTIONS
Follow-up with orthopedic surgery.  Take Tylenol, ibuprofen, lidocaine patches as needed at home for pain.  Return to the emergency department with any worsening pain, weakness in the left arm or any other concerning symptoms.

## 2025-03-13 ENCOUNTER — APPOINTMENT (OUTPATIENT)
Dept: PRIMARY CARE | Facility: CLINIC | Age: 50
End: 2025-03-13
Payer: COMMERCIAL

## 2025-03-27 ENCOUNTER — TELEPHONE (OUTPATIENT)
Dept: PRIMARY CARE | Facility: CLINIC | Age: 50
End: 2025-03-27

## 2025-03-27 ENCOUNTER — APPOINTMENT (OUTPATIENT)
Dept: PRIMARY CARE | Facility: CLINIC | Age: 50
End: 2025-03-27
Payer: COMMERCIAL

## 2025-03-27 VITALS
HEART RATE: 100 BPM | DIASTOLIC BLOOD PRESSURE: 82 MMHG | HEIGHT: 64 IN | BODY MASS INDEX: 20.11 KG/M2 | TEMPERATURE: 98.2 F | WEIGHT: 117.8 LBS | OXYGEN SATURATION: 97 % | SYSTOLIC BLOOD PRESSURE: 122 MMHG

## 2025-03-27 DIAGNOSIS — K21.9 GASTROESOPHAGEAL REFLUX DISEASE, UNSPECIFIED WHETHER ESOPHAGITIS PRESENT: ICD-10-CM

## 2025-03-27 DIAGNOSIS — G43.009 MIGRAINE WITHOUT AURA AND WITHOUT STATUS MIGRAINOSUS, NOT INTRACTABLE: ICD-10-CM

## 2025-03-27 DIAGNOSIS — R11.0 NAUSEA: ICD-10-CM

## 2025-03-27 DIAGNOSIS — K59.03 DRUG-INDUCED CONSTIPATION: ICD-10-CM

## 2025-03-27 DIAGNOSIS — S46.002D INJURY OF LEFT ROTATOR CUFF, SUBSEQUENT ENCOUNTER: Primary | ICD-10-CM

## 2025-03-27 DIAGNOSIS — S46.002A INJURY OF LEFT ROTATOR CUFF, INITIAL ENCOUNTER: ICD-10-CM

## 2025-03-27 DIAGNOSIS — E55.9 VITAMIN D DEFICIENCY: ICD-10-CM

## 2025-03-27 DIAGNOSIS — J45.40 MODERATE PERSISTENT ASTHMA WITHOUT COMPLICATION (HHS-HCC): ICD-10-CM

## 2025-03-27 PROCEDURE — 99214 OFFICE O/P EST MOD 30 MIN: CPT | Performed by: NURSE PRACTITIONER

## 2025-03-27 PROCEDURE — 3008F BODY MASS INDEX DOCD: CPT | Performed by: NURSE PRACTITIONER

## 2025-03-27 PROCEDURE — 1036F TOBACCO NON-USER: CPT | Performed by: NURSE PRACTITIONER

## 2025-03-27 RX ORDER — TOPIRAMATE 100 MG/1
100 TABLET, FILM COATED ORAL DAILY
Qty: 90 TABLET | Refills: 3 | Status: SHIPPED | OUTPATIENT
Start: 2025-03-27 | End: 2026-03-27

## 2025-03-27 RX ORDER — ONDANSETRON 4 MG/1
4 TABLET, FILM COATED ORAL EVERY 8 HOURS PRN
Qty: 20 TABLET | Refills: 2 | Status: SHIPPED | OUTPATIENT
Start: 2025-03-27

## 2025-03-27 RX ORDER — ESOMEPRAZOLE MAGNESIUM 40 MG/1
40 CAPSULE, DELAYED RELEASE ORAL 2 TIMES DAILY
Qty: 180 CAPSULE | Refills: 3 | Status: SHIPPED | OUTPATIENT
Start: 2025-03-27 | End: 2026-03-27

## 2025-03-27 RX ORDER — POLYETHYLENE GLYCOL 3350 17 G/17G
17 POWDER, FOR SOLUTION ORAL DAILY PRN
Qty: 510 G | Refills: 3 | Status: SHIPPED | OUTPATIENT
Start: 2025-03-27

## 2025-03-27 RX ORDER — BUDESONIDE AND FORMOTEROL FUMARATE DIHYDRATE 160; 4.5 UG/1; UG/1
2 AEROSOL RESPIRATORY (INHALATION)
Qty: 10.2 G | Refills: 3 | Status: SHIPPED | OUTPATIENT
Start: 2025-03-27 | End: 2026-03-27

## 2025-03-27 RX ORDER — LIDOCAINE 560 MG/1
1 PATCH PERCUTANEOUS; TOPICAL; TRANSDERMAL DAILY
Qty: 30 PATCH | Refills: 2 | Status: SHIPPED | OUTPATIENT
Start: 2025-03-27

## 2025-03-27 ASSESSMENT — ENCOUNTER SYMPTOMS
EYE DISCHARGE: 0
PALPITATIONS: 0
WHEEZING: 0
OCCASIONAL FEELINGS OF UNSTEADINESS: 0
APPETITE CHANGE: 0
HEADACHES: 0
NERVOUS/ANXIOUS: 0
BRUISES/BLEEDS EASILY: 0
DIZZINESS: 0
COUGH: 0
LOSS OF SENSATION IN FEET: 0
PHOTOPHOBIA: 0
AGITATION: 0
TREMORS: 0
SINUS PAIN: 0
SHORTNESS OF BREATH: 0
SORE THROAT: 0
DYSURIA: 0
WEAKNESS: 0
BACK PAIN: 0
ABDOMINAL PAIN: 0
JOINT SWELLING: 0
DEPRESSION: 0
HEMATURIA: 0
CONSTIPATION: 0
BLOOD IN STOOL: 0
MYALGIAS: 1
ADENOPATHY: 0
ACTIVITY CHANGE: 0
ARTHRALGIAS: 0
DIARRHEA: 0

## 2025-03-27 ASSESSMENT — PAIN SCALES - GENERAL: PAINLEVEL_OUTOF10: 3

## 2025-03-27 ASSESSMENT — PATIENT HEALTH QUESTIONNAIRE - PHQ9
10. IF YOU CHECKED OFF ANY PROBLEMS, HOW DIFFICULT HAVE THESE PROBLEMS MADE IT FOR YOU TO DO YOUR WORK, TAKE CARE OF THINGS AT HOME, OR GET ALONG WITH OTHER PEOPLE: NOT DIFFICULT AT ALL
2. FEELING DOWN, DEPRESSED OR HOPELESS: SEVERAL DAYS
SUM OF ALL RESPONSES TO PHQ9 QUESTIONS 1 AND 2: 1
1. LITTLE INTEREST OR PLEASURE IN DOING THINGS: NOT AT ALL

## 2025-03-27 ASSESSMENT — LIFESTYLE VARIABLES: HOW MANY STANDARD DRINKS CONTAINING ALCOHOL DO YOU HAVE ON A TYPICAL DAY: PATIENT DOES NOT DRINK

## 2025-03-27 NOTE — LETTER
March 28, 2025     Alecia Welch  [id number] [facility] [address]      Dear Ms. Welch:    Below are the results from your recent visit:  Vitamin D level is normal.     Resulted Orders   Vitamin D 25-Hydroxy,Total (for eval of Vitamin D levels)   Result Value Ref Range    VITAMIN D,25-OH,TOTAL,IA 63 30 - 100 ng/mL      Comment:      Vitamin D Status         25-OH Vitamin D:     Deficiency:                    <20 ng/mL  Insufficiency:             20 - 29 ng/mL  Optimal:                 > or = 30 ng/mL     For 25-OH Vitamin D testing on patients on   D2-supplementation and patients for whom quantitation   of D2 and D3 fractions is required, the QuestAssureD(TM)  25-OH VIT D, (D2,D3), LC/MS/MS is recommended: order   code 26942 (patients >2yrs).     See Note 1     Note 1     For additional information, please refer to   http://education.[email or web address]/faq/JKS704   (This link is being provided for informational/  educational purposes only.)         If you have any questions or concerns, please don't hesitate to call.         Sincerely,        [facility]

## 2025-03-27 NOTE — PROGRESS NOTES
Subjective   Patient ID: Alecia Welch is a 49 y.o. female who presents for Follow-up (Discuss referral.She has the FIT test at home to complete.).    Presents today with concerns for ongoing left shoulder pain. She notes she had a fall in December where she landed on her outstretched arm.  She experienced sharp pain at the time.  She did go to the emergency room when pain did not resolve.  She received an x-ray with no acute findings.  At that time she was given a referral for orthopedic surgery which she is not interested in at this time.  She was also given some exercises to do.  She reports she has been doing these exercises with no improvement in her mobility but minimal improvement in her pain.  She has been utilizing lidocaine patches and ibuprofen with Tylenol to help manage her discomfort.  She would like a referral to physical therapy and is interested in receiving her physical therapy at the [facility].  She declines follow-up with orthopedics at this time.  She is due for follow-up vitamin D level which we will obtain today.  Will address results when available.  She also needs refills on some of her medications.  She continues to follow with Dr. Nj for her psychiatric needs.  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  She has been sober for about 20 years.         Review of Systems   Constitutional:  Negative for activity change and appetite change.   HENT:  Negative for congestion, ear pain, hearing loss, sinus pain and sore throat.    Eyes:  Negative for photophobia, discharge and visual disturbance.   Respiratory:  Negative for cough, shortness of breath and wheezing.    Cardiovascular:  Negative for chest pain, palpitations and leg swelling.   Gastrointestinal:  Negative for abdominal pain, blood in stool, constipation and diarrhea.   Endocrine: Negative for cold intolerance, heat intolerance and polyuria.   Genitourinary:  Negative for  "dysuria and hematuria.   Musculoskeletal:  Positive for myalgias. Negative for arthralgias, back pain and joint swelling.   Skin:  Negative for rash.   Allergic/Immunologic: Negative for environmental allergies and food allergies.   Neurological:  Negative for dizziness, tremors, syncope, weakness and headaches.   Hematological:  Negative for adenopathy. Does not bruise/bleed easily.   Psychiatric/Behavioral:  Negative for agitation and suicidal ideas. The patient is not nervous/anxious.        Objective   /82 (BP Location: Right arm, Patient Position: Sitting)   Pulse 100   Temp 36.8 °C (98.2 °F) (Temporal)   Ht 1.626 m (5' 4\")   Wt 53.4 kg (117 lb 12.8 oz)   SpO2 97%   BMI 20.22 kg/m²     Physical Exam  Vitals reviewed.   Constitutional:       General: She is not in acute distress.     Appearance: Normal appearance.   HENT:      Head: Normocephalic.      Right Ear: Tympanic membrane normal.      Left Ear: Tympanic membrane normal.      Nose: Nose normal.      Mouth/Throat:      Mouth: Mucous membranes are moist.   Eyes:      Conjunctiva/sclera: Conjunctivae normal.      Pupils: Pupils are equal, round, and reactive to light.   Cardiovascular:      Rate and Rhythm: Normal rate and regular rhythm.      Pulses: Normal pulses.      Heart sounds: Normal heart sounds.   Pulmonary:      Effort: Pulmonary effort is normal.      Breath sounds: Normal breath sounds.   Abdominal:      General: Bowel sounds are normal.      Palpations: Abdomen is soft.   Musculoskeletal:      Left shoulder: Tenderness and crepitus present. No swelling or deformity. Decreased range of motion.      Comments: Able to abduct 45 degrees from center.Forward abduction to 30 degrees. Mild weakness appreciated   Skin:     General: Skin is warm and dry.      Capillary Refill: Capillary refill takes less than 2 seconds.   Neurological:      Mental Status: She is alert and oriented to person, place, and time.   Psychiatric:         Mood and " Affect: Mood normal.         Speech: Speech normal.       Assessment/Plan   Problem List Items Addressed This Visit             ICD-10-CM    Gastroesophageal reflux disease K21.9    Relevant Medications    esomeprazole (NexIUM) 40 mg DR capsule    Other Relevant Orders    Follow Up In Primary Care - Established    Vitamin D deficiency E55.9    Relevant Orders    Vitamin D 25-Hydroxy,Total (for eval of Vitamin D levels)    Asthma J45.909    Relevant Medications    budesonide-formoterol (Symbicort) 160-4.5 mcg/actuation inhaler    Other Relevant Orders    Follow Up In Primary Care - Established     Other Visit Diagnoses         Codes    Injury of left rotator cuff, subsequent encounter    -  Primary S46.002D    Relevant Orders    Referral to Physical Therapy    Injury of left rotator cuff, initial encounter     S46.002A    Relevant Medications    lidocaine 4 % patch    Migraine without aura and without status migrainosus, not intractable     G43.009    Relevant Medications    ondansetron (Zofran) 4 mg tablet    topiramate (Topamax) 100 mg tablet    Nausea     R11.0    Relevant Medications    ondansetron (Zofran) 4 mg tablet    Drug-induced constipation     K59.03    Relevant Medications    polyethylene glycol (Glycolax, Miralax) 17 gram/dose powder

## 2025-03-27 NOTE — PATIENT INSTRUCTIONS
Call Central Scheduling to schedule appointment with Physical Therapy [phone]  Continue exercises that you have been doing  Can continue  Lidocaine patches and ibuprofen as needed for discomfort  Make sure to remove Lidocaine patches after 12 hours  If symptoms continue consider orthopedic evaluation

## 2025-03-28 LAB — 25(OH)D3+25(OH)D2 SERPL-MCNC: 63 NG/ML (ref 30–100)

## 2025-04-13 ENCOUNTER — HOSPITAL ENCOUNTER (EMERGENCY)
Facility: HOSPITAL | Age: 50
Discharge: HOME | End: 2025-04-13
Attending: STUDENT IN AN ORGANIZED HEALTH CARE EDUCATION/TRAINING PROGRAM
Payer: COMMERCIAL

## 2025-04-13 VITALS
BODY MASS INDEX: 20.14 KG/M2 | OXYGEN SATURATION: 98 % | RESPIRATION RATE: 22 BRPM | SYSTOLIC BLOOD PRESSURE: 100 MMHG | HEIGHT: 64 IN | TEMPERATURE: 98.6 F | HEART RATE: 90 BPM | WEIGHT: 118 LBS | DIASTOLIC BLOOD PRESSURE: 80 MMHG

## 2025-04-13 DIAGNOSIS — Z76.0 MEDICATION REFILL: ICD-10-CM

## 2025-04-13 DIAGNOSIS — F41.9 ANXIETY: Primary | ICD-10-CM

## 2025-04-13 PROCEDURE — 99283 EMERGENCY DEPT VISIT LOW MDM: CPT | Performed by: STUDENT IN AN ORGANIZED HEALTH CARE EDUCATION/TRAINING PROGRAM

## 2025-04-13 PROCEDURE — 2500000001 HC RX 250 WO HCPCS SELF ADMINISTERED DRUGS (ALT 637 FOR MEDICARE OP): Performed by: STUDENT IN AN ORGANIZED HEALTH CARE EDUCATION/TRAINING PROGRAM

## 2025-04-13 RX ORDER — LORAZEPAM 1 MG/1
1 TABLET ORAL ONCE
Status: COMPLETED | OUTPATIENT
Start: 2025-04-13 | End: 2025-04-13

## 2025-04-13 RX ORDER — LORAZEPAM 1 MG/1
1 TABLET ORAL 4 TIMES DAILY
Qty: 20 TABLET | Refills: 0 | Status: SHIPPED | OUTPATIENT
Start: 2025-04-13 | End: 2025-04-18

## 2025-04-13 RX ADMIN — LORAZEPAM 1 MG: 1 TABLET ORAL at 00:46

## 2025-04-13 SDOH — HEALTH STABILITY: MENTAL HEALTH: BEHAVIORS/MOOD: ANXIOUS;CRYING

## 2025-04-13 SDOH — HEALTH STABILITY: MENTAL HEALTH: BEHAVIORAL HEALTH(WDL): EXCEPTIONS TO WDL

## 2025-04-13 ASSESSMENT — LIFESTYLE VARIABLES
HAVE PEOPLE ANNOYED YOU BY CRITICIZING YOUR DRINKING: NO
HAVE YOU EVER FELT YOU SHOULD CUT DOWN ON YOUR DRINKING: NO
EVER FELT BAD OR GUILTY ABOUT YOUR DRINKING: NO
TOTAL SCORE: 0
EVER HAD A DRINK FIRST THING IN THE MORNING TO STEADY YOUR NERVES TO GET RID OF A HANGOVER: NO

## 2025-04-13 ASSESSMENT — PAIN - FUNCTIONAL ASSESSMENT: PAIN_FUNCTIONAL_ASSESSMENT: 0-10

## 2025-04-13 ASSESSMENT — PAIN SCALES - GENERAL: PAINLEVEL_OUTOF10: 0 - NO PAIN

## 2025-04-13 NOTE — ED PROVIDER NOTES
Chief Complaint: Anxiety, benzo withdrawal  HPI: This is a 49-year-old female, presenting to the emergency department for anxiety attack as well as benzodiazepine withdrawal.  Patient states that she is Ativan 1 mg every 4 for the last several years.  She states that she messed up her appointments, and missed her most recent appointment and was unable to get a refill on her Ativan prescription.  She states her last dose of Ativan was 2 days ago.  She states that her anxiety is severe, and she feels that she may be going through some withdrawal.  She denies any chest pain, shortness of breath, abdominal pain, nausea, vomiting, diarrhea.    Past Medical History:   Diagnosis Date    Drug abuse (Multi)     Personal history of other infectious and parasitic diseases     History of infectious mononucleosis      Past Surgical History:   Procedure Laterality Date    CARPAL TUNNEL RELEASE  03/23/2016    Neuroplasty Decompression Median Nerve At Carpal Tunnel    OTHER SURGICAL HISTORY  08/07/2019    Thyroid surgery       Physical Exam  Vitals reviewed.   Constitutional:       Appearance: Normal appearance.      Comments: Tremors   HENT:      Head: Normocephalic and atraumatic.      Mouth/Throat:      Mouth: Mucous membranes are moist.   Eyes:      Conjunctiva/sclera: Conjunctivae normal.   Cardiovascular:      Rate and Rhythm: Regular rhythm. Tachycardia present.   Pulmonary:      Effort: Pulmonary effort is normal.   Abdominal:      General: Abdomen is flat.      Palpations: Abdomen is soft.   Musculoskeletal:         General: Normal range of motion.      Cervical back: Normal range of motion.   Skin:     General: Skin is warm and dry.   Neurological:      General: No focal deficit present.      Mental Status: She is alert.   Psychiatric:         Mood and Affect: Mood normal.            ED Course/MDM  Diagnoses as of 04/13/25 0227   Anxiety   Medication refill       This is a 49 y.o. female presenting to the ED for  evaluation of severe anxiety, as well as medication refill.  Patient states that she missed her outpatient appointment for her Ativan prescription refill 2 days ago took her last dose of Ativan.  She states that she is on 1 mg 4 times a day for the last several years.  On physical exam, the patient does appear very anxious, has a slight tremor and is tachycardic.  Blood pressure is slightly elevated.  Patient was given her dose of Ativan with significant improvement in her symptoms.  On reevaluation, the patient is no longer tremoring, and her heart rate is back to normal.  I do feel that the patient's symptoms are likely related to benzo withdrawal.  I do not feel that any lab work or imaging is indicated at this time.  She was given a 5-day prescription refill, which will give her enough time to follow-up with her outpatient provider.  She was encouraged to return to the emergency department for any new or worsening symptoms, and was ultimately discharged home in stable condition.    Final Impression  1.  Anxiety  2.  Medication refill  Disposition/Plan: discharge home   Condition at disposition: Stable.     Ashely Hanley DO  Emergency Medicine Physician     Ashely Hanley DO  04/13/25 0231

## 2025-05-15 DIAGNOSIS — J45.40 MODERATE PERSISTENT ASTHMA WITHOUT COMPLICATION (HHS-HCC): ICD-10-CM

## 2025-05-15 RX ORDER — ALBUTEROL SULFATE 90 UG/1
2 INHALANT RESPIRATORY (INHALATION) EVERY 4 HOURS PRN
Qty: 8.5 G | Refills: 3 | Status: SHIPPED | OUTPATIENT
Start: 2025-05-15

## 2025-06-26 ENCOUNTER — APPOINTMENT (OUTPATIENT)
Dept: PRIMARY CARE | Facility: CLINIC | Age: 50
End: 2025-06-26
Payer: COMMERCIAL

## 2025-06-26 VITALS
BODY MASS INDEX: 19.19 KG/M2 | HEART RATE: 74 BPM | OXYGEN SATURATION: 99 % | TEMPERATURE: 97.2 F | SYSTOLIC BLOOD PRESSURE: 110 MMHG | WEIGHT: 112.4 LBS | DIASTOLIC BLOOD PRESSURE: 66 MMHG | HEIGHT: 64 IN

## 2025-06-26 DIAGNOSIS — Z12.31 ENCOUNTER FOR SCREENING MAMMOGRAM FOR MALIGNANT NEOPLASM OF BREAST: ICD-10-CM

## 2025-06-26 DIAGNOSIS — J45.40 MODERATE PERSISTENT ASTHMA WITHOUT COMPLICATION (HHS-HCC): ICD-10-CM

## 2025-06-26 DIAGNOSIS — Z12.11 COLON CANCER SCREENING: ICD-10-CM

## 2025-06-26 DIAGNOSIS — Z23 NEED FOR VACCINATION: ICD-10-CM

## 2025-06-26 DIAGNOSIS — S46.002A INJURY OF LEFT ROTATOR CUFF, INITIAL ENCOUNTER: ICD-10-CM

## 2025-06-26 DIAGNOSIS — Z00.00 ROUTINE GENERAL MEDICAL EXAMINATION AT HEALTH CARE FACILITY: Primary | ICD-10-CM

## 2025-06-26 DIAGNOSIS — G43.009 MIGRAINE WITHOUT AURA AND WITHOUT STATUS MIGRAINOSUS, NOT INTRACTABLE: ICD-10-CM

## 2025-06-26 DIAGNOSIS — K21.9 GASTROESOPHAGEAL REFLUX DISEASE, UNSPECIFIED WHETHER ESOPHAGITIS PRESENT: ICD-10-CM

## 2025-06-26 DIAGNOSIS — F32.4 MAJOR DEPRESSIVE DISORDER IN PARTIAL REMISSION, UNSPECIFIED WHETHER RECURRENT: ICD-10-CM

## 2025-06-26 PROBLEM — M79.10 MUSCLE PAIN: Status: RESOLVED | Noted: 2023-12-06 | Resolved: 2025-06-26

## 2025-06-26 PROBLEM — G43.709 CHRONIC MIGRAINE WITHOUT AURA: Status: RESOLVED | Noted: 2023-12-06 | Resolved: 2025-06-26

## 2025-06-26 PROBLEM — M51.369 DEGENERATION OF LUMBAR INTERVERTEBRAL DISC: Status: RESOLVED | Noted: 2023-12-06 | Resolved: 2025-06-26

## 2025-06-26 PROBLEM — S93.409A ANKLE SPRAIN: Status: RESOLVED | Noted: 2023-12-06 | Resolved: 2025-06-26

## 2025-06-26 PROBLEM — S63.90XA HAND SPRAIN: Status: RESOLVED | Noted: 2023-12-06 | Resolved: 2025-06-26

## 2025-06-26 PROBLEM — M41.9 SCOLIOSIS OF THORACIC SPINE: Status: RESOLVED | Noted: 2023-12-06 | Resolved: 2025-06-26

## 2025-06-26 PROBLEM — M54.2 NECK PAIN ON LEFT SIDE: Status: RESOLVED | Noted: 2023-12-06 | Resolved: 2025-06-26

## 2025-06-26 PROBLEM — B27.90 INFECTIOUS MONONUCLEOSIS: Status: RESOLVED | Noted: 2023-12-06 | Resolved: 2025-06-26

## 2025-06-26 PROCEDURE — G0439 PPPS, SUBSEQ VISIT: HCPCS | Performed by: NURSE PRACTITIONER

## 2025-06-26 PROCEDURE — G0009 ADMIN PNEUMOCOCCAL VACCINE: HCPCS | Performed by: NURSE PRACTITIONER

## 2025-06-26 PROCEDURE — 90677 PCV20 VACCINE IM: CPT | Performed by: NURSE PRACTITIONER

## 2025-06-26 PROCEDURE — 3008F BODY MASS INDEX DOCD: CPT | Performed by: NURSE PRACTITIONER

## 2025-06-26 PROCEDURE — 1036F TOBACCO NON-USER: CPT | Performed by: NURSE PRACTITIONER

## 2025-06-26 RX ORDER — BUDESONIDE AND FORMOTEROL FUMARATE DIHYDRATE 160; 4.5 UG/1; UG/1
2 AEROSOL RESPIRATORY (INHALATION)
Qty: 10.2 G | Refills: 3 | Status: SHIPPED | OUTPATIENT
Start: 2025-06-26 | End: 2026-06-26

## 2025-06-26 RX ORDER — ALBUTEROL SULFATE 90 UG/1
2 INHALANT RESPIRATORY (INHALATION) EVERY 4 HOURS PRN
Qty: 8.5 G | Refills: 3 | Status: SHIPPED | OUTPATIENT
Start: 2025-06-26

## 2025-06-26 RX ORDER — ACETAMINOPHEN 500 MG
1000 TABLET ORAL EVERY 6 HOURS PRN
Qty: 100 TABLET | Refills: 0 | Status: SHIPPED | OUTPATIENT
Start: 2025-06-26

## 2025-06-26 ASSESSMENT — ENCOUNTER SYMPTOMS
PHOTOPHOBIA: 0
WEAKNESS: 0
PALPITATIONS: 0
WHEEZING: 0
COUGH: 0
ADENOPATHY: 0
CONSTIPATION: 0
BLOOD IN STOOL: 0
SINUS PAIN: 0
HEADACHES: 0
DIARRHEA: 0
LOSS OF SENSATION IN FEET: 0
NERVOUS/ANXIOUS: 0
DYSURIA: 0
ARTHRALGIAS: 0
ACTIVITY CHANGE: 0
SORE THROAT: 0
DEPRESSION: 0
APPETITE CHANGE: 0
TREMORS: 0
OCCASIONAL FEELINGS OF UNSTEADINESS: 0
MYALGIAS: 1
BACK PAIN: 0
JOINT SWELLING: 0
ABDOMINAL PAIN: 0
SHORTNESS OF BREATH: 0
AGITATION: 0
HEMATURIA: 0
EYE DISCHARGE: 0
BRUISES/BLEEDS EASILY: 0
DIZZINESS: 0

## 2025-06-26 ASSESSMENT — PAIN SCALES - GENERAL: PAINLEVEL_OUTOF10: 0-NO PAIN

## 2025-06-26 ASSESSMENT — PATIENT HEALTH QUESTIONNAIRE - PHQ9
SUM OF ALL RESPONSES TO PHQ9 QUESTIONS 1 AND 2: 4
4. FEELING TIRED OR HAVING LITTLE ENERGY: NEARLY EVERY DAY
7. TROUBLE CONCENTRATING ON THINGS, SUCH AS READING THE NEWSPAPER OR WATCHING TELEVISION: NOT AT ALL
1. LITTLE INTEREST OR PLEASURE IN DOING THINGS: MORE THAN HALF THE DAYS
8. MOVING OR SPEAKING SO SLOWLY THAT OTHER PEOPLE COULD HAVE NOTICED. OR THE OPPOSITE, BEING SO FIGETY OR RESTLESS THAT YOU HAVE BEEN MOVING AROUND A LOT MORE THAN USUAL: NOT AT ALL
2. FEELING DOWN, DEPRESSED OR HOPELESS: MORE THAN HALF THE DAYS
6. FEELING BAD ABOUT YOURSELF - OR THAT YOU ARE A FAILURE OR HAVE LET YOURSELF OR YOUR FAMILY DOWN: NOT AT ALL
SUM OF ALL RESPONSES TO PHQ QUESTIONS 1-9: 13
3. TROUBLE FALLING OR STAYING ASLEEP OR SLEEPING TOO MUCH: NEARLY EVERY DAY
5. POOR APPETITE OR OVEREATING: NEARLY EVERY DAY
9. THOUGHTS THAT YOU WOULD BE BETTER OFF DEAD, OR OF HURTING YOURSELF: NOT AT ALL

## 2025-06-26 ASSESSMENT — ACTIVITIES OF DAILY LIVING (ADL)
BATHING: INDEPENDENT
TAKING_MEDICATION: INDEPENDENT
MANAGING_FINANCES: INDEPENDENT
DRESSING: INDEPENDENT
DOING_HOUSEWORK: INDEPENDENT
GROCERY_SHOPPING: INDEPENDENT

## 2025-06-26 ASSESSMENT — LIFESTYLE VARIABLES: HOW MANY STANDARD DRINKS CONTAINING ALCOHOL DO YOU HAVE ON A TYPICAL DAY: PATIENT DOES NOT DRINK

## 2025-06-26 NOTE — PATIENT INSTRUCTIONS
Ibuprofen or tylenol or alternate as needed for discomfort  No more than 2400 mg of ibuprofen in 24 hours  No more than 3000 mg of tylenol in 24 hours  Focus on healthy eating including lean proteins and vegetables.  Avoid high carbohydrate high sugar foods and drinks.  Try to increase physical activity as tolerated.  Goal is for 160 minutes/week of physical activity.  Check blood pressure 2-3 times a week.  Call for blood pressures greater than 140/90.  Bring list of blood pressures to next visit.  Recommend getting Shingles Vaccine to help prevent shingles

## 2025-06-26 NOTE — ASSESSMENT & PLAN NOTE
Orders:    Follow Up In Primary Care - Established    Follow Up In Primary Care - Established    budesonide-formoterol (Symbicort) 160-4.5 mcg/actuation inhaler; Inhale 2 puffs 2 times a day.    albuterol 90 mcg/actuation inhaler; Inhale 2 puffs every 4 hours if needed for wheezing.

## 2025-06-26 NOTE — PROGRESS NOTES
Subjective   Reason for Visit: Alecia Welch is an 49 y.o. female here for a Medicare Wellness visit.     Past Medical, Surgical, and Family History reviewed and updated in chart.    Reviewed all medications by prescribing practitioner or clinical pharmacist (such as prescriptions, OTCs, herbal therapies and supplements) and documented in the medical record.    Presents today for annual Medicare wellness exam.  Continues to follow with Dr. Nj for psychiatry.  She continues to work with her recovery program.  She remains sober.  She denies chest pain or shortness of breath.  She denies headaches or dizziness.  After discussion she is agreeable to pneumonia vaccine.  Recommend shingles vaccination.  Discussed risks and benefits of both.  She verbalizes understanding.  She has been utilizing Tylenol and/or ibuprofen as needed for arm pain.  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  Cussed risks and benefits of using Tylenol and ibuprofen for pain management.  She verbalizes understanding.  * This note was partially generated using the Dragon Voice recognition system. There may be some incorrect wording, spelling and/or spelling errors or punctuation errors that were not corrected prior to committing the note to the medical record.*        Patient Care Team:  CELENA Mcdonough as PCP - General (Family Medicine)  CELENA Mcdonough as PCP - [facility] PCP     Review of Systems   Constitutional:  Negative for activity change and appetite change.   HENT:  Negative for congestion, ear pain, hearing loss, sinus pain and sore throat.    Eyes:  Negative for photophobia, discharge and visual disturbance.   Respiratory:  Negative for cough, shortness of breath and wheezing.    Cardiovascular:  Negative for chest pain, palpitations and leg swelling.   Gastrointestinal:  Negative for abdominal pain, blood in stool, constipation and diarrhea.   Endocrine: Negative for cold  "intolerance, heat intolerance and polyuria.   Genitourinary:  Negative for dysuria and hematuria.   Musculoskeletal:  Positive for myalgias. Negative for arthralgias, back pain and joint swelling.   Skin:  Negative for rash.   Allergic/Immunologic: Negative for environmental allergies and food allergies.   Neurological:  Negative for dizziness, tremors, syncope, weakness and headaches.   Hematological:  Negative for adenopathy. Does not bruise/bleed easily.   Psychiatric/Behavioral:  Negative for agitation and suicidal ideas. The patient is not nervous/anxious.        Objective   Vitals:  /66 (BP Location: Right arm, Patient Position: Sitting)   Pulse 74   Temp 36.2 °C (97.2 °F) (Temporal)   Ht 1.626 m (5' 4\")   Wt 51 kg (112 lb 6.4 oz)   SpO2 99%   BMI 19.29 kg/m²       Physical Exam  Vitals reviewed.   Constitutional:       General: She is not in acute distress.     Appearance: Normal appearance.   HENT:      Head: Normocephalic.      Right Ear: Tympanic membrane normal.      Left Ear: Tympanic membrane normal.      Nose: Nose normal.      Mouth/Throat:      Mouth: Mucous membranes are moist.   Eyes:      Conjunctiva/sclera: Conjunctivae normal.      Pupils: Pupils are equal, round, and reactive to light.   Cardiovascular:      Rate and Rhythm: Normal rate and regular rhythm.      Pulses: Normal pulses.      Heart sounds: Normal heart sounds.   Pulmonary:      Effort: Pulmonary effort is normal.      Breath sounds: Normal breath sounds.   Abdominal:      General: Bowel sounds are normal.      Palpations: Abdomen is soft.   Musculoskeletal:      Left shoulder: Tenderness and crepitus present. No swelling or deformity. Decreased range of motion.      Comments: Able to abduct 45 degrees from center.Forward abduction to 30 degrees. Mild weakness appreciated   Skin:     General: Skin is warm and dry.      Capillary Refill: Capillary refill takes less than 2 seconds.   Neurological:      Mental Status: She " is alert and oriented to person, place, and time.   Psychiatric:         Mood and Affect: Mood normal.         Speech: Speech normal.         Assessment & Plan  Moderate persistent asthma without complication ([facility])    Orders:    Follow Up In Primary Care - Established    Follow Up In Primary Care - Established    budesonide-formoterol (Symbicort) 160-4.5 mcg/actuation inhaler; Inhale 2 puffs 2 times a day.    albuterol 90 mcg/actuation inhaler; Inhale 2 puffs every 4 hours if needed for wheezing.    Gastroesophageal reflux disease, unspecified whether esophagitis present    Orders:    Follow Up In Primary Care - Established    Major depressive disorder in partial remission, unspecified whether recurrent    Orders:    Follow Up In Primary Care - Established    Migraine without aura and without status migrainosus, not intractable    Orders:    Follow Up In Primary Care - Established    Encounter for screening mammogram for malignant neoplasm of breast    Orders:    BI mammo bilateral screening tomosynthesis; Future    Need for vaccination    Orders:    Pneumococcal conjugate vaccine, 20-valent (PREVNAR 20)    Injury of left rotator cuff, initial encounter    Orders:    acetaminophen (Tylenol) 500 mg tablet; Take 2 tablets (1,000 mg) by mouth every 6 hours if needed for mild pain (1 - 3) or fever (temp greater than 38.0 C).    Colon cancer screening    Orders:    Cologuard® colon cancer screening; Future

## 2025-07-25 DIAGNOSIS — K59.03 DRUG-INDUCED CONSTIPATION: Primary | ICD-10-CM

## 2025-07-27 RX ORDER — POLYETHYLENE GLYCOL 3350 17 G/17G
POWDER, FOR SOLUTION ORAL
Qty: 510 G | Refills: 0 | Status: SHIPPED | OUTPATIENT
Start: 2025-07-27

## 2025-09-26 ENCOUNTER — APPOINTMENT (OUTPATIENT)
Dept: PRIMARY CARE | Facility: CLINIC | Age: 50
End: 2025-09-26
Payer: COMMERCIAL

## 2026-03-13 ENCOUNTER — APPOINTMENT (OUTPATIENT)
Dept: PRIMARY CARE | Facility: CLINIC | Age: 51
End: 2026-03-13
Payer: COMMERCIAL

## 2026-06-26 ENCOUNTER — APPOINTMENT (OUTPATIENT)
Dept: PRIMARY CARE | Facility: CLINIC | Age: 51
End: 2026-06-26
Payer: COMMERCIAL